# Patient Record
Sex: FEMALE | URBAN - METROPOLITAN AREA
[De-identification: names, ages, dates, MRNs, and addresses within clinical notes are randomized per-mention and may not be internally consistent; named-entity substitution may affect disease eponyms.]

---

## 2021-01-01 ENCOUNTER — INPATIENT (INPATIENT)
Facility: HOSPITAL | Age: 56
LOS: 0 days | End: 2021-03-29
Attending: INTERNAL MEDICINE | Admitting: INTERNAL MEDICINE
Payer: SUBSIDIZED

## 2021-01-01 VITALS — DIASTOLIC BLOOD PRESSURE: 143 MMHG | SYSTOLIC BLOOD PRESSURE: 259 MMHG

## 2021-01-01 DIAGNOSIS — I61.8 OTHER NONTRAUMATIC INTRACEREBRAL HEMORRHAGE: ICD-10-CM

## 2021-01-01 DIAGNOSIS — Z51.5 ENCOUNTER FOR PALLIATIVE CARE: ICD-10-CM

## 2021-01-01 DIAGNOSIS — I61.9 NONTRAUMATIC INTRACEREBRAL HEMORRHAGE, UNSPECIFIED: ICD-10-CM

## 2021-01-01 DIAGNOSIS — J96.01 ACUTE RESPIRATORY FAILURE WITH HYPOXIA: ICD-10-CM

## 2021-01-01 LAB
ALBUMIN SERPL ELPH-MCNC: 3.7 G/DL — SIGNIFICANT CHANGE UP (ref 3.3–5)
ALP SERPL-CCNC: 112 U/L — SIGNIFICANT CHANGE UP (ref 40–120)
ALT FLD-CCNC: 21 U/L — SIGNIFICANT CHANGE UP (ref 4–33)
ANION GAP SERPL CALC-SCNC: 11 MMOL/L — SIGNIFICANT CHANGE UP (ref 7–14)
ANION GAP SERPL CALC-SCNC: 13 MMOL/L — SIGNIFICANT CHANGE UP (ref 7–14)
AST SERPL-CCNC: 15 U/L — SIGNIFICANT CHANGE UP (ref 4–32)
BILIRUB SERPL-MCNC: 0.9 MG/DL — SIGNIFICANT CHANGE UP (ref 0.2–1.2)
BLD GP AB SCN SERPL QL: NEGATIVE — SIGNIFICANT CHANGE UP
BLOOD GAS ARTERIAL COMPREHENSIVE RESULT: SIGNIFICANT CHANGE UP
BUN SERPL-MCNC: 14 MG/DL — SIGNIFICANT CHANGE UP (ref 7–23)
BUN SERPL-MCNC: 20 MG/DL — SIGNIFICANT CHANGE UP (ref 7–23)
CALCIUM SERPL-MCNC: 8.6 MG/DL — SIGNIFICANT CHANGE UP (ref 8.4–10.5)
CALCIUM SERPL-MCNC: 9.2 MG/DL — SIGNIFICANT CHANGE UP (ref 8.4–10.5)
CHLORIDE SERPL-SCNC: 105 MMOL/L — SIGNIFICANT CHANGE UP (ref 98–107)
CHLORIDE SERPL-SCNC: 112 MMOL/L — HIGH (ref 98–107)
CO2 SERPL-SCNC: 22 MMOL/L — SIGNIFICANT CHANGE UP (ref 22–31)
CO2 SERPL-SCNC: 24 MMOL/L — SIGNIFICANT CHANGE UP (ref 22–31)
CREAT SERPL-MCNC: 0.81 MG/DL — SIGNIFICANT CHANGE UP (ref 0.5–1.3)
CREAT SERPL-MCNC: 1.22 MG/DL — SIGNIFICANT CHANGE UP (ref 0.5–1.3)
GLUCOSE BLDC GLUCOMTR-MCNC: 115 MG/DL — HIGH (ref 70–99)
GLUCOSE BLDC GLUCOMTR-MCNC: 150 MG/DL — HIGH (ref 70–99)
GLUCOSE SERPL-MCNC: 121 MG/DL — HIGH (ref 70–99)
GLUCOSE SERPL-MCNC: 122 MG/DL — HIGH (ref 70–99)
HCT VFR BLD CALC: 39.2 % — SIGNIFICANT CHANGE UP (ref 34.5–45)
HCT VFR BLD CALC: 39.7 % — SIGNIFICANT CHANGE UP (ref 34.5–45)
HGB BLD-MCNC: 12.7 G/DL — SIGNIFICANT CHANGE UP (ref 11.5–15.5)
HGB BLD-MCNC: 13.3 G/DL — SIGNIFICANT CHANGE UP (ref 11.5–15.5)
MAGNESIUM SERPL-MCNC: 1.8 MG/DL — SIGNIFICANT CHANGE UP (ref 1.6–2.6)
MAGNESIUM SERPL-MCNC: 2 MG/DL — SIGNIFICANT CHANGE UP (ref 1.6–2.6)
MCHC RBC-ENTMCNC: 28.3 PG — SIGNIFICANT CHANGE UP (ref 27–34)
MCHC RBC-ENTMCNC: 28.9 PG — SIGNIFICANT CHANGE UP (ref 27–34)
MCHC RBC-ENTMCNC: 32.4 GM/DL — SIGNIFICANT CHANGE UP (ref 32–36)
MCHC RBC-ENTMCNC: 33.5 GM/DL — SIGNIFICANT CHANGE UP (ref 32–36)
MCV RBC AUTO: 86.3 FL — SIGNIFICANT CHANGE UP (ref 80–100)
MCV RBC AUTO: 87.3 FL — SIGNIFICANT CHANGE UP (ref 80–100)
NRBC # BLD: 0 /100 WBCS — SIGNIFICANT CHANGE UP
NRBC # BLD: 0 /100 WBCS — SIGNIFICANT CHANGE UP
NRBC # FLD: 0 K/UL — SIGNIFICANT CHANGE UP
NRBC # FLD: 0.02 K/UL — HIGH
PHOSPHATE SERPL-MCNC: 2.3 MG/DL — LOW (ref 2.5–4.5)
PHOSPHATE SERPL-MCNC: 3.6 MG/DL — SIGNIFICANT CHANGE UP (ref 2.5–4.5)
PLATELET # BLD AUTO: 324 K/UL — SIGNIFICANT CHANGE UP (ref 150–400)
PLATELET # BLD AUTO: 440 K/UL — HIGH (ref 150–400)
POTASSIUM SERPL-MCNC: 3.1 MMOL/L — LOW (ref 3.5–5.3)
POTASSIUM SERPL-MCNC: 3.2 MMOL/L — LOW (ref 3.5–5.3)
POTASSIUM SERPL-SCNC: 3.1 MMOL/L — LOW (ref 3.5–5.3)
POTASSIUM SERPL-SCNC: 3.2 MMOL/L — LOW (ref 3.5–5.3)
PROT SERPL-MCNC: 5.9 G/DL — LOW (ref 6–8.3)
RBC # BLD: 4.49 M/UL — SIGNIFICANT CHANGE UP (ref 3.8–5.2)
RBC # BLD: 4.6 M/UL — SIGNIFICANT CHANGE UP (ref 3.8–5.2)
RBC # FLD: 12.9 % — SIGNIFICANT CHANGE UP (ref 10.3–14.5)
RBC # FLD: 13.4 % — SIGNIFICANT CHANGE UP (ref 10.3–14.5)
RH IG SCN BLD-IMP: POSITIVE — SIGNIFICANT CHANGE UP
SODIUM SERPL-SCNC: 140 MMOL/L — SIGNIFICANT CHANGE UP (ref 135–145)
SODIUM SERPL-SCNC: 147 MMOL/L — HIGH (ref 135–145)
WBC # BLD: 18.57 K/UL — HIGH (ref 3.8–10.5)
WBC # BLD: 8.14 K/UL — SIGNIFICANT CHANGE UP (ref 3.8–10.5)
WBC # FLD AUTO: 18.57 K/UL — HIGH (ref 3.8–10.5)
WBC # FLD AUTO: 8.14 K/UL — SIGNIFICANT CHANGE UP (ref 3.8–10.5)

## 2021-01-01 PROCEDURE — 70496 CT ANGIOGRAPHY HEAD: CPT | Mod: 26

## 2021-01-01 PROCEDURE — 71045 X-RAY EXAM CHEST 1 VIEW: CPT | Mod: 26,76

## 2021-01-01 PROCEDURE — 36556 INSERT NON-TUNNEL CV CATH: CPT

## 2021-01-01 PROCEDURE — 99291 CRITICAL CARE FIRST HOUR: CPT | Mod: 25

## 2021-01-01 PROCEDURE — 31500 INSERT EMERGENCY AIRWAY: CPT

## 2021-01-01 PROCEDURE — 36620 INSERTION CATHETER ARTERY: CPT | Mod: GC

## 2021-01-01 PROCEDURE — 99222 1ST HOSP IP/OBS MODERATE 55: CPT | Mod: GC

## 2021-01-01 PROCEDURE — 70498 CT ANGIOGRAPHY NECK: CPT | Mod: 26

## 2021-01-01 PROCEDURE — 70450 CT HEAD/BRAIN W/O DYE: CPT | Mod: 26,59

## 2021-01-01 RX ORDER — CHLORHEXIDINE GLUCONATE 213 G/1000ML
15 SOLUTION TOPICAL EVERY 12 HOURS
Refills: 0 | Status: DISCONTINUED | OUTPATIENT
Start: 2021-01-01 | End: 2021-03-30

## 2021-01-01 RX ORDER — NOREPINEPHRINE BITARTRATE/D5W 8 MG/250ML
0.05 PLASTIC BAG, INJECTION (ML) INTRAVENOUS
Qty: 8 | Refills: 0 | Status: DISCONTINUED | OUTPATIENT
Start: 2021-01-01 | End: 2021-03-30

## 2021-01-01 RX ORDER — POTASSIUM CHLORIDE 20 MEQ
40 PACKET (EA) ORAL ONCE
Refills: 0 | Status: DISCONTINUED | OUTPATIENT
Start: 2021-01-01 | End: 2021-01-01

## 2021-01-01 RX ORDER — NICARDIPINE HYDROCHLORIDE 30 MG/1
5 CAPSULE, EXTENDED RELEASE ORAL
Qty: 40 | Refills: 0 | Status: DISCONTINUED | OUTPATIENT
Start: 2021-01-01 | End: 2021-03-30

## 2021-01-01 RX ORDER — POTASSIUM CHLORIDE 20 MEQ
40 PACKET (EA) ORAL ONCE
Refills: 0 | Status: COMPLETED | OUTPATIENT
Start: 2021-01-01 | End: 2021-01-01

## 2021-01-01 RX ORDER — SODIUM CHLORIDE 9 MG/ML
10 INJECTION INTRAMUSCULAR; INTRAVENOUS; SUBCUTANEOUS
Refills: 0 | Status: DISCONTINUED | OUTPATIENT
Start: 2021-01-01 | End: 2021-01-01

## 2021-01-01 RX ORDER — PROPOFOL 10 MG/ML
100 INJECTION, EMULSION INTRAVENOUS ONCE
Refills: 0 | Status: COMPLETED | OUTPATIENT
Start: 2021-01-01 | End: 2021-01-01

## 2021-01-01 RX ORDER — NICARDIPINE HYDROCHLORIDE 30 MG/1
5 CAPSULE, EXTENDED RELEASE ORAL
Qty: 40 | Refills: 0 | Status: DISCONTINUED | OUTPATIENT
Start: 2021-01-01 | End: 2021-01-01

## 2021-01-01 RX ORDER — MIDAZOLAM HYDROCHLORIDE 1 MG/ML
4 INJECTION, SOLUTION INTRAMUSCULAR; INTRAVENOUS ONCE
Refills: 0 | Status: DISCONTINUED | OUTPATIENT
Start: 2021-01-01 | End: 2021-01-01

## 2021-01-01 RX ORDER — SODIUM CHLORIDE 9 MG/ML
1000 INJECTION, SOLUTION INTRAVENOUS
Refills: 0 | Status: DISCONTINUED | OUTPATIENT
Start: 2021-01-01 | End: 2021-03-30

## 2021-01-01 RX ORDER — FENTANYL CITRATE 50 UG/ML
0.5 INJECTION INTRAVENOUS
Qty: 2500 | Refills: 0 | Status: DISCONTINUED | OUTPATIENT
Start: 2021-01-01 | End: 2021-01-01

## 2021-01-01 RX ORDER — LABETALOL HCL 100 MG
5 TABLET ORAL ONCE
Refills: 0 | Status: COMPLETED | OUTPATIENT
Start: 2021-01-01 | End: 2021-01-01

## 2021-01-01 RX ORDER — MIDAZOLAM HYDROCHLORIDE 1 MG/ML
0.02 INJECTION, SOLUTION INTRAMUSCULAR; INTRAVENOUS
Qty: 100 | Refills: 0 | Status: DISCONTINUED | OUTPATIENT
Start: 2021-01-01 | End: 2021-01-01

## 2021-01-01 RX ORDER — CHLORHEXIDINE GLUCONATE 213 G/1000ML
1 SOLUTION TOPICAL
Refills: 0 | Status: DISCONTINUED | OUTPATIENT
Start: 2021-01-01 | End: 2021-01-01

## 2021-01-01 RX ORDER — POTASSIUM PHOSPHATE, MONOBASIC POTASSIUM PHOSPHATE, DIBASIC 236; 224 MG/ML; MG/ML
15 INJECTION, SOLUTION INTRAVENOUS ONCE
Refills: 0 | Status: COMPLETED | OUTPATIENT
Start: 2021-01-01 | End: 2021-01-01

## 2021-01-01 RX ORDER — ETOMIDATE 2 MG/ML
20 INJECTION INTRAVENOUS ONCE
Refills: 0 | Status: COMPLETED | OUTPATIENT
Start: 2021-01-01 | End: 2021-01-01

## 2021-01-01 RX ADMIN — Medication 40 MILLIEQUIVALENT(S): at 16:43

## 2021-01-01 RX ADMIN — CHLORHEXIDINE GLUCONATE 1 APPLICATION(S): 213 SOLUTION TOPICAL at 06:17

## 2021-01-01 RX ADMIN — ETOMIDATE 20 MILLIGRAM(S): 2 INJECTION INTRAVENOUS at 08:02

## 2021-01-01 RX ADMIN — Medication 6.56 MICROGRAM(S)/KG/MIN: at 10:00

## 2021-01-01 RX ADMIN — NICARDIPINE HYDROCHLORIDE 25 MG/HR: 30 CAPSULE, EXTENDED RELEASE ORAL at 17:13

## 2021-01-01 RX ADMIN — MIDAZOLAM HYDROCHLORIDE 4 MILLIGRAM(S): 1 INJECTION, SOLUTION INTRAMUSCULAR; INTRAVENOUS at 08:02

## 2021-01-01 RX ADMIN — Medication 6.56 MICROGRAM(S)/KG/MIN: at 11:25

## 2021-01-01 RX ADMIN — Medication 40 MILLIEQUIVALENT(S): at 11:28

## 2021-01-01 RX ADMIN — POTASSIUM PHOSPHATE, MONOBASIC POTASSIUM PHOSPHATE, DIBASIC 62.5 MILLIMOLE(S): 236; 224 INJECTION, SOLUTION INTRAVENOUS at 16:36

## 2021-01-01 RX ADMIN — Medication 6.56 MICROGRAM(S)/KG/MIN: at 08:45

## 2021-01-01 RX ADMIN — SODIUM CHLORIDE 75 MILLILITER(S): 9 INJECTION, SOLUTION INTRAVENOUS at 11:25

## 2021-01-01 RX ADMIN — Medication 6.56 MICROGRAM(S)/KG/MIN: at 07:51

## 2021-01-01 RX ADMIN — Medication 5 MILLIGRAM(S): at 15:52

## 2021-01-01 RX ADMIN — CHLORHEXIDINE GLUCONATE 15 MILLILITER(S): 213 SOLUTION TOPICAL at 05:14

## 2021-01-01 RX ADMIN — CHLORHEXIDINE GLUCONATE 15 MILLILITER(S): 213 SOLUTION TOPICAL at 17:55

## 2021-01-01 RX ADMIN — CHLORHEXIDINE GLUCONATE 15 MILLILITER(S): 213 SOLUTION TOPICAL at 18:05

## 2021-01-01 RX ADMIN — PROPOFOL 100 MILLIGRAM(S): 10 INJECTION, EMULSION INTRAVENOUS at 08:03

## 2021-03-28 NOTE — CONSULT NOTE ADULT - ASSESSMENT
56F w/ PMH of HTN presents w/ AMS and reported right hemiparesis in setting of drug use (cocaine) found to have large IPH w/ IVH extension and hydrocephalus.     Code stroke called  NIHSS   LKN 0500 3/28/21  No tpa given ICH   CTH w/ large L. IPH w/ IVH extension and hydrocephalus   CTA h/n pending     Impression: IPH w/ IVH extension and hydrocephalus secondary to HTN in setting of cocaine use.     Plan:   [] NSG consult STAT  [] CTH 4h; CTH 24H (unless MR brain done instead)   [] BP Goal: <160/90; Will likely need cardene drip   [] Mechanical DVT ppx for now   [] Hold all antiplatelets  [] Avoid hypernatremia; No need for hypertonic saline or osmotic agents at this time   [] PT/OT 56F w/ PMH of HTN presents w/ AMS and reported right hemiparesis in setting of drug use (cocaine) found to have large IPH w/ IVH extension and hydrocephalus.     Code stroke called  NIHSS   LKN 0500 3/28/21  No tpa given ICH   CTH w/ large L. IPH w/ IVH extension and hydrocephalus   CTA h/n pending     Impression: IPH w/ IVH extension and hydrocephalus secondary to HTN in setting of cocaine use. ICH score 4 (97% mortality)     Plan:   [] NSG consult STAT  [] CTH 4h; CTH 24H (unless MR brain done instead)   [] BP Goal: <160/90; Will likely need cardene drip   [] Mechanical DVT ppx for now   [] Hold all antiplatelets  [] Avoid hypernatremia; No need for hypertonic saline or osmotic agents at this time   [] PT/OT

## 2021-03-28 NOTE — H&P ADULT - HISTORY OF PRESENT ILLNESS
56F BIBEMS after being found unresponsive at home by boyfriend. Of note had been on cocaine and shared a bottle of rum with boyfriend in the day prior.     In ED minimally responsive to noxious stimuli, unable to protect airway so intubated. Stroke code called, CTH showing large L-sided IPH in left basal ganglia with left to right subfalcine herniation, left uncal herniation, and downward herniation, with intraventricular extension of hemorrhage. NSGY called, recommended no interventions. Advised performing brain death protocol. Initially hypertensive to 200s sys, started on cardene gtt. Subsequently became hypotensive, cardene gtt dc'd and started levo.    56F BIBEMS for altered mental status. Was doing cocaine and drinking alcohol with her boyfriend. Patient was noted to be normal at 0500 3/28/21 by boyfriend. At 0530, she reportedly developed right sided numbness and facial droop. She became unresponsive shortly therafter. EMS was called and brought to [facility] ED. Per boyfriend, she was using cocaine heavily that night.     In ED minimally responsive to noxious stimuli, unable to protect airway so intubated. Stroke code called, CTH showing large L-sided IPH in left basal ganglia with left to right subfalcine herniation, left uncal herniation, and downward herniation, with intraventricular extension of hemorrhage. NSGY called, recommended no interventions. Advised performing brain death protocol. Initially hypertensive to 200s sys, started on cardene gtt. Subsequently became hypotensive, cardene gtt dc'd and started levo.

## 2021-03-28 NOTE — ED ADULT NURSE NOTE - OBJECTIVE STATEMENT
Patient is a 56y female, brought directly to trauma C. Patient was brought in by ambulance. As per EMS, patients  said they were "doing cocaine all night." Patient unresponsive. On nonrebreather mask oxygen at 10L. IV initiated 20 gauge right hand and 18 gauge left hand. Blood glucose fingerstick completed at 0713 with result of 218. Patient on cardiac monitor. Hypertensive. Breathing labored and noisy. ED tech, RNs and MDs at bedside. Patient is a 56y female, brought directly to trauma C. Patient was brought in by ambulance. As per EMS, patients  said they were "doing cocaine all night." Patient unresponsive. On nonrebreather mask oxygen at 10L. IV initiated 20 gauge right hand and 18 gauge left hand. Blood glucose fingerstick completed at 0713 with result of 218. Patient on cardiac monitor. Hypertensive. Breathing labored and noisy. ED tech, RNs and MDs at bedside. Patient skin on left upper extremity is red in color. Patient is a 56y female, brought directly to trauma C. Patient was brought in by ambulance. As per EMS, patients  said they were "doing cocaine all night." Patient unresponsive. On nonrebreather mask oxygen at 10L. IV initiated 20 gauge right hand and 18 gauge left hand. Blood glucose fingerstick completed at 0713 with result of 218. Patient on cardiac monitor. Hypertensive. Breathing labored and noisy. ED tech, RNs and MDs at bedside. Patient skin on left upper extremity is red in color.   Noel urinary Catheter inserted by facilitator RN, 14 Singaporean. Patient is a 56y female, brought directly to trauma C. Patient was brought in by ambulance. As per EMS, patients  said they were "doing cocaine all night." Patient unresponsive. On nonrebreather mask oxygen at 10L. IV initiated 20 gauge right hand and 18 gauge left hand. Blood glucose fingerstick completed at 0713 with result of 218. Patient on cardiac monitor. Hypertensive. Breathing labored and noisy. ED tech, RNs and MDs at bedside. Patient skin on left upper extremity is red in color. Patient intubated, 23 at lip, size 7.5  Noel urinary Catheter inserted by facilitator RN, 14 Austrian.

## 2021-03-28 NOTE — ED PROVIDER NOTE - PROGRESS NOTE DETAILS
Iain Jason MD: 7:40am: called boyfriend  at [phone]: reports that the pt went to lie down at 5am, pt reported R arm numbness and tingling and then tried to get comfortable - pt then started getting unable to stand, slurring speech, boyfriend unable to understand speech, called EMS, said she had difficulty breathing at that time too but attributed to the cocaine. boyfriend reports they had cocaine/etoh intake over the past 2 days, shared 1 bottle rum over 2 days, the pt ingested 2g cocaine. code stroke called Large head bleed on CT scan. Nicardipine gtt ordered, RN aware, will start when back from CT. NSGY paged.  Keyshawn Cobb M.D. PGY-3 Pt has 2 children ages 21, 16. Boyfriend unaware of their numbers. Only family contact number provided is uncle Yobany [phone]. Will involve APPLE Cobb M.D. PGY-3 Pt returned from CT first BP significantly lower. Cardene gtt not yet started, monitor BP may need levo. NSGY at bedside

## 2021-03-28 NOTE — CONSULT NOTE ADULT - SUBJECTIVE AND OBJECTIVE BOX
ADONAY GUEVARA 56y ,Female  HPI:   per patients boyfriend,  pt went to lie down at 5am, she reported R arm numbness and tingling and then tried to get comfortable - pt then started getting unable to stand, slurring speech, boyfriend unable to understand speech, called EMS, said she had difficulty breathing at that time too but attributed to the cocaine. boyfriend reports they had cocaine/etoh intake over the past 2 days, shared 1 bottle rum over 2 days, the pt ingested 2g cocaine. patient became unresponsive, as per ER resident, patient was fixed and dilated on arrival with no spontaneous movements, code stroke called.  CTH shows a large left parietal ICH with intraventricular extension w/ complete effacement of the cisterns and 4th ventricle and uncal herniation, will f/u with official read from neuroradiology.    PAST MEDICAL & SURGICAL HISTORY:  unknown    Allergies  No Known Allergies    MEDICATIONS  (STANDING):  niCARdipine Infusion 5 mG/Hr (25 mL/Hr) IV Continuous <Continuous>    MEDICATIONS  (PRN):    Vital Signs Last 24 Hrs  T(C): --  T(F): --  HR: 120 (28 Mar 2021 07:44) (76 - 120)  BP: 245/185 (28 Mar 2021 07:52) (219/134 - 259/143)  BP(mean): --  RR: 21 (28 Mar 2021 07:44) (21 - 21)  SpO2: 100% (28 Mar 2021 07:44) (100% - 100%)    PE: intubated, off sedation, no paralytics given for intubation  pupils fixed and dilated, no corneal reflex, no gag, negative occulocephalic's  no movement to noxious stimuli    LABS:                        15.4   13.93 )-----------( 508      ( 28 Mar 2021 07:33 )             47.0     03-28    134<L>  |  97<L>  |  14  ----------------------------<  246<H>  5.1   |  23  |  0.65    Ca    10.1      28 Mar 2021 07:33    TPro  8.4<H>  /  Alb  4.5  /  TBili  0.7  /  DBili  x   /  AST  x   /  ALT  33  /  AlkPhos  135<H>  03-28    PT/INR - ( 28 Mar 2021 07:33 )   PT: 11.3 sec;   INR: 0.99 ratio         PTT - ( 28 Mar 2021 07:33 )  PTT:29.4 sec

## 2021-03-28 NOTE — H&P ADULT - ASSESSMENT
56F presents unresponsive and found to have large intraparenchymal hemorrhage, with midline shift and herniation.     #Neuro    #CV    #Resp    #Renal    #GI    #Heme     #ID    #Endo  56F presents unresponsive and found to have large intraparenchymal hemorrhage, with midline shift and herniation.     #Neuro  -presents with unresponsiveness after reporting left sided numbness, in setting of recent cocaine use. CTH showing large L-sided IPH in left basal ganglia with left to right subfalcine herniation, left uncal herniation, and downward herniation, with intraventricular extension of hemorrhage.   -pupils fixed and dilated. NSGY called, recommended no interventions. Advised performing brain death protocol.      #CV  -s/p cardene gtt now shock state on levo  -recent cocaine use, avoid beta blockade     #Resp  -intubated for airway protection in ED  -not triggering breaths    -on 16/400/5/40    #Renal  -renal function intact   -monitor BMP, strict I/Os    #GI  -NPO for now     #Heme   -no active issues   -no AC given brain bleed     #ID  -leukocytosis, afebrile. monitor for signs of infection     #Endo   -FSG Q6 while NPO

## 2021-03-28 NOTE — ED PROVIDER NOTE - CLINICAL SUMMARY MEDICAL DECISION MAKING FREE TEXT BOX
56F presents with AMS: cocaine toxicity vs likely CVA event. Intubated for airway protection. CT head shows large ICH. NSGY following, will admit to MICU.

## 2021-03-28 NOTE — PROCEDURE NOTE - NSPROCDETAILS_GEN_ALL_CORE
location identified, draped/prepped, sterile technique used, needle inserted/introduced/positive blood return obtained via catheter/connected to a pressurized flush line/sutured in place identified with ultrasound/location identified, draped/prepped, sterile technique used, needle inserted/introduced/positive blood return obtained via catheter/connected to a pressurized flush line/sutured in place

## 2021-03-28 NOTE — H&P ADULT - ATTENDING COMMENTS
56 F with unknown pmh here after doing cocaine the last few days and now with right sided numbness, acute hypoxemic respiratory failure requiring intubation due to large left-sided intraparenchymal hemorrhage and left uncal herniation, likely due to cocaine use.    Will keep SBP goal <140 given bleed.  Will check labs, assess for brain death  may need abx for possible aspiration event  f/u COVID  very poor prognosis, family aware

## 2021-03-28 NOTE — ED PROVIDER NOTE - OBJECTIVE STATEMENT
56F presents with AMS. EMS reports pt was doing cocaine and drinking alcohol with her boyfriend when she eventually became altered and 911 was called.  Unknown PMH/PSH/allergies

## 2021-03-28 NOTE — CONSULT NOTE ADULT - ATTENDING COMMENTS
Reviewed clinical history exam findings and neuroimaging with resident, very poor prognosis upon review of chart and brain imaging..  Unable to examine the patient for brain death protocol since she is hypothermic. Re-exame when able.

## 2021-03-28 NOTE — ED PROVIDER NOTE - ATTENDING CONTRIBUTION TO CARE
Eren WELLS: I agree with the above provided history and exam and addend/modify it as follows.    55F w/ pmh HTN (not on any meds) - bib EMS for drug OD - only responsive painful stimuli. pt not spontaneously opening eyes, w/ stridulous coarse noisy breathing, increased work of breathing.     *GEN:   unconscious, unresponsive  *EYES:   pupils dilated bilaterally unresponsive to light  *HEENT:   airway patent  *CV:   regular rate and rhythm  *RESP:   coarse loud breath sounds, agonal breathing with increased work   *ABD:   soft, non-tender  *:   no cva/flank tenderness  *EXTREM:   no MSK tenderness, full ROM throughout, no leg swelling  *SKIN:   dry, intact  *NEURO:   unconscious, unresponsive    concern for substance overdose - patient intubated for airway protection. Per discussion with pt's boyfriend concern for acute stroke, code stroke ordered. Lower concern for dissection given no report of chest pain by boyfriend, equal pulses / perfusion bilat upper and lower extremities.    I Iain Jason MD performed a history and physical exam of the patient and discussed their management with the resident and /or advanced care provider. I reviewed the resident and /or ACP's note and agree with the documented findings and plan of care. My medical decision making and observations are found above.

## 2021-03-28 NOTE — CONSULT NOTE ADULT - SUBJECTIVE AND OBJECTIVE BOX
ADONAY GUEVRAA  Female  MRN-[id number]    HPI:    56F w/ PMH of HTN presents w/ AMS and reported right hemiparesis in setting of drug use (cocaine)     Patient was noted to be normal at 0500 3/28/21 by boyfriend. At 0530, she reportedly developed right sided numbness and facial droop. She became unresponsive shortly therafter. EMS was called and brought to [facility] ED.     In ED, she was reportedly "responding to noxious stimuli on arrival". Pupils were reportedly fixed. She was intubated for airway protection. Code stroke called given the focal findings preceding the AMS.     Code stroke called  NIHSS   LKN 0500 3/28/21  No tpa given ***  CTH  CTA h/n     NIHSS:  MRS: 0    ROS: All negative except as mentioned in HPI    PAST MEDICAL & SURGICAL HISTORY:    MEDICATIONS  (STANDING):  etomidate Injectable 20 milliGRAM(s) IV Push Once  fentaNYL   Infusion. 0.5 MICROgram(s)/kG/Hr (3.5 mL/Hr) IV Continuous <Continuous>  midazolam Infusion 0.02 mG/kG/Hr (1.4 mL/Hr) IV Continuous <Continuous>  midazolam Injectable 4 milliGRAM(s) IV Push Once  propofol Injectable 100 milliGRAM(s) IV Push once    MEDICATIONS  (PRN):    Allergies    No Known Allergies    Intolerances        VITAL SIGNS:  T(F): --  HR: 120  BP: 245/185  RR: 21  SpO2: 100%    Exam:   Gen: Intubated  MS: Eyes closed. Does not open to verbal or noxious stimuli. Does not follow any commands.   CN: Pupils 5mm and fixed; No OCR. Face symmetric though difficult to ascertain given intubation.   Motor: No movement in the UEs. LE triple flexion.     LABS:                          15.4   13.93 )-----------( 508      ( 28 Mar 2021 07:33 )             47.0           PT/INR - ( 28 Mar 2021 07:33 )   PT: 11.3 sec;   INR: 0.99 ratio         PTT - ( 28 Mar 2021 07:33 )  PTT:29.4 sec    RADIOLOGY & ADDITIONAL STUDIES:             ADONAY GUEVARA  Female  MRN-[id number]    HPI:    56F w/ PMH of HTN presents w/ AMS and reported right hemiparesis in setting of drug use (cocaine)     Patient was noted to be normal at 0500 3/28/21 by boyfriend. At 0530, she reportedly developed right sided numbness and facial droop. She became unresponsive shortly therafter. EMS was called and brought to [facility] ED. Per boyfriend, she was using cocaine heavily that night.     In ED, she was reportedly "responding to noxious stimuli on arrival". Pupils were reportedly fixed. She was intubated for airway protection. Code stroke called given the focal findings preceding the AMS.     Code stroke called  NIHSS   LKN 0500 3/28/21  No tpa given ***  CTH  CTA h/n     NIHSS:  MRS: 0    ROS: All negative except as mentioned in HPI    PAST MEDICAL & SURGICAL HISTORY:    MEDICATIONS  (STANDING):  etomidate Injectable 20 milliGRAM(s) IV Push Once  fentaNYL   Infusion. 0.5 MICROgram(s)/kG/Hr (3.5 mL/Hr) IV Continuous <Continuous>  midazolam Infusion 0.02 mG/kG/Hr (1.4 mL/Hr) IV Continuous <Continuous>  midazolam Injectable 4 milliGRAM(s) IV Push Once  propofol Injectable 100 milliGRAM(s) IV Push once    MEDICATIONS  (PRN):    Allergies    No Known Allergies    Intolerances        VITAL SIGNS:  T(F): --  HR: 120  BP: 245/185  RR: 21  SpO2: 100%    Exam:   Gen: Intubated  MS: Eyes closed. Does not open to verbal or noxious stimuli. Does not follow any commands.   CN: Pupils 5mm and fixed; No OCR. Face symmetric though difficult to ascertain given intubation.   Motor: No movement in the UEs. LE triple flexion.     LABS:                          15.4   13.93 )-----------( 508      ( 28 Mar 2021 07:33 )             47.0           PT/INR - ( 28 Mar 2021 07:33 )   PT: 11.3 sec;   INR: 0.99 ratio         PTT - ( 28 Mar 2021 07:33 )  PTT:29.4 sec    RADIOLOGY & ADDITIONAL STUDIES:             ADONAY GUEVARA  Female  MRN-[id number]    HPI:    56F w/ PMH of HTN presents w/ AMS and reported right hemiparesis in setting of drug use (cocaine)     Patient was noted to be normal at 0500 3/28/21 by boyfriend. At 0530, she reportedly developed right sided numbness and facial droop. She became unresponsive shortly therafter. EMS was called and brought to [facility] ED. Per boyfriend, she was using cocaine heavily that night.     In ED, she was reportedly "responding to noxious stimuli on arrival". Pupils were reportedly fixed. She was intubated for airway protection. Code stroke called given the focal findings preceding the AMS.     Code stroke called  NIHSS   LKN 0500 3/28/21  No tpa given ICH   CTH w/ large L. IPH w/ IVH extension and hydrocephalus   CTA h/n pending     NIHSS:  MRS: 0    ROS: All negative except as mentioned in HPI    PAST MEDICAL & SURGICAL HISTORY:    MEDICATIONS  (STANDING):  etomidate Injectable 20 milliGRAM(s) IV Push Once  fentaNYL   Infusion. 0.5 MICROgram(s)/kG/Hr (3.5 mL/Hr) IV Continuous <Continuous>  midazolam Infusion 0.02 mG/kG/Hr (1.4 mL/Hr) IV Continuous <Continuous>  midazolam Injectable 4 milliGRAM(s) IV Push Once  propofol Injectable 100 milliGRAM(s) IV Push once    MEDICATIONS  (PRN):    Allergies    No Known Allergies    Intolerances        VITAL SIGNS:  T(F): --  HR: 120  BP: 245/185  RR: 21  SpO2: 100%    Exam:   Gen: Intubated  MS: Eyes closed. Does not open to verbal or noxious stimuli. Does not follow any commands.   CN: Pupils 5mm and fixed; No OCR. Face symmetric though difficult to ascertain given intubation.   Motor: No movement in the UEs. LE triple flexion.     LABS:                          15.4   13.93 )-----------( 508      ( 28 Mar 2021 07:33 )             47.0           PT/INR - ( 28 Mar 2021 07:33 )   PT: 11.3 sec;   INR: 0.99 ratio         PTT - ( 28 Mar 2021 07:33 )  PTT:29.4 sec    RADIOLOGY & ADDITIONAL STUDIES:             ADONAY GUEVARA  Female  MRN-[id number]    HPI:    56F w/ PMH of HTN presents w/ AMS and reported right hemiparesis in setting of drug use (cocaine)     Patient was noted to be normal at 0500 3/28/21 by boyfriend. At 0530, she reportedly developed right sided numbness and facial droop. She became unresponsive shortly therafter. EMS was called and brought to [facility] ED. Per boyfriend, she was using cocaine heavily that night.     In ED, she was reportedly "responding to noxious stimuli on arrival". Pupils were reportedly fixed. She was intubated for airway protection. Code stroke called given the focal findings preceding the AMS.     Code stroke called  NIHSS   LKN 0500 3/28/21  No tpa given ICH   CTH w/ large L. IPH w/ IVH extension and hydrocephalus   CTA h/n pending     NIHSS:  MRS: 0  ICH score 4    ROS: All negative except as mentioned in HPI    PAST MEDICAL & SURGICAL HISTORY:    MEDICATIONS  (STANDING):  etomidate Injectable 20 milliGRAM(s) IV Push Once  fentaNYL   Infusion. 0.5 MICROgram(s)/kG/Hr (3.5 mL/Hr) IV Continuous <Continuous>  midazolam Infusion 0.02 mG/kG/Hr (1.4 mL/Hr) IV Continuous <Continuous>  midazolam Injectable 4 milliGRAM(s) IV Push Once  propofol Injectable 100 milliGRAM(s) IV Push once    MEDICATIONS  (PRN):    Allergies    No Known Allergies    Intolerances        VITAL SIGNS:  T(F): --  HR: 120  BP: 245/185  RR: 21  SpO2: 100%    Exam:   Gen: Intubated  MS: Eyes closed. Does not open to verbal or noxious stimuli. Does not follow any commands.   CN: Pupils 5mm and fixed; No OCR. Face symmetric though difficult to ascertain given intubation.   Motor: No movement in the UEs. LE triple flexion.     LABS:                          15.4   13.93 )-----------( 508      ( 28 Mar 2021 07:33 )             47.0           PT/INR - ( 28 Mar 2021 07:33 )   PT: 11.3 sec;   INR: 0.99 ratio         PTT - ( 28 Mar 2021 07:33 )  PTT:29.4 sec    RADIOLOGY & ADDITIONAL STUDIES:

## 2021-03-28 NOTE — ED ADULT NURSE REASSESSMENT NOTE - NS ED NURSE REASSESS COMMENT FT1
Received report from SHARIF Shore. Pt was in CT scan now returns to [facility]. Pt intubated, no sedation at this time. Pt unrepsonsive to painful stimuli. Pt was previously hypertensive, now hypotensive on arrival back to [facility]. Neurosurg at bedside, will continue to monitor.

## 2021-03-28 NOTE — ED PROCEDURE NOTE - CPROC ED TIME OUT STATEMENT1
“Patient's name, , procedure and correct site were confirmed during the [place] Timeout.”
“Patient's name, , procedure and correct site were confirmed during the [place] Timeout.”

## 2021-03-28 NOTE — PROCEDURE NOTE - NSINDICATIONS_GEN_A_CORE
arterial puncture to obtain ABG's/blood sampling arterial puncture to obtain ABG's/blood sampling/critical patient

## 2021-03-28 NOTE — H&P ADULT - NSHPLABSRESULTS_GEN_ALL_CORE
LABS:                        15.4   13.93 )-----------( 508      ( 28 Mar 2021 07:33 )             47.0         134<L>  |  97<L>  |  14  ----------------------------<  246<H>  5.1   |  23  |  0.65    Ca    10.1      28 Mar 2021 07:33    TPro  8.4<H>  /  Alb  4.5  /  TBili  0.7  /  DBili  x   /  AST  51<H>  /  ALT  33  /  AlkPhos  135<H>      PT/INR - ( 28 Mar 2021 07:33 )   PT: 11.3 sec;   INR: 0.99 ratio         PTT - ( 28 Mar 2021 07:33 )  PTT:29.4 sec      Urinalysis Basic - ( 28 Mar 2021 08:54 )    Color: Colorless / Appearance: Clear / S.010 / pH: x  Gluc: x / Ketone: Trace  / Bili: Negative / Urobili: <2 mg/dL   Blood: x / Protein: 100 mg/dL / Nitrite: Negative   Leuk Esterase: Negative / RBC: 2 /HPF / WBC 1 /HPF   Sq Epi: x / Non Sq Epi: 0 /HPF / Bacteria: Negative          RADIOLOGY & ADDITIONAL TESTS:  Results Reviewed:   < from: CT Angio Head w/ IV Cont (21 @ 08:17) >    FINDINGS:   No previous examinations are available for review.    ET tube and NG tube are in place.    The carotid circulation is intact without hemodynamically significant stenosis.   The vertebral arteries are patent.    Intracranial vertebral arteries are intact without evidence of dissection or hemodynamically significant stenosis. There is mild narrowing of the distal basilar artery secondary to compression from mass effect from brain edema, however remains patent. There may be mild narrowingof the bilateral posterior cerebral arteries secondary to mass effect versus vasospasm, however remain patent.    The intracranial internal carotid arteries, middle cerebral arteries, and anterior cerebral arteries are intact without hemodynamically significant stenosis.    There is no evidence of aneurysm or vascular malformation.      IMPRESSION:        1.   Right carotid system:  No hemodynamically significant stenosis.        2.   Left carotid system:  No hemodynamically significant stenosis.        3.   Intracranial circulation: No aneurysm or AVM. There is mild narrowing of the distal basilar artery secondary to compression from mass effect from brain edema, however remains patent. There may be mild narrowing of the bilateral posterior cerebral arteries secondary to mass effect versus vasospasm, however remain patent.            < end of copied text >    < from: CT Brain Stroke Protocol (21 @ 08:15) >    IMPRESSION:    Large left-sided intraparenchymal hemorrhage centered in the left basal ganglia with left to right subfalcine herniation, left uncal herniation, and downward herniation, with intraventricular extension of hemorrhage, as detailed above.    Dr. Real discussed these findings with Dr. Osei on 3/28/2021 8:18 AM with read back.        < end of copied text >

## 2021-03-28 NOTE — ED PROVIDER NOTE - PHYSICAL EXAMINATION
General: Well developed, well nourished female, diaphoretic.   HEENT: Normocephalic and atraumatic, pupils 7mm bilaterally, non-reactive. Trachea midline.   Cardiac: Normal S1 and S2 w/ RRR. No MRG.  Pulmonary: CTA bilaterally. laboured breathing, intermittently stridorus   Abdominal: Soft, NTND  Neurologic: Obtunded, not withdrawing from pain.  Musculoskeletal: No limited ROM.  Vascular: Warm and well perfused. Radial pulses 2+ and equal bilaterally   Skin: Color appropriate for race.   Psychiatric: obtunded   Keyshawn Cobb M.D. PGY-3

## 2021-03-28 NOTE — H&P ADULT - NSHPPHYSICALEXAM_GEN_ALL_CORE
Vital Signs Last 24 Hrs  T(C): --  T(F): --  HR: 76 (28 Mar 2021 09:51) (68 - 120)  BP: 121/81 (28 Mar 2021 09:24) (80/60 - 259/143)  BP(mean): 91 (28 Mar 2021 09:24) (62 - 105)  RR: 16 (28 Mar 2021 09:24) (16 - 21)  SpO2: 98% (28 Mar 2021 09:51) (98% - 100%)    PHYSICAL EXAM:  GENERAL: intubated, not responsive to noxious stimuli   HEAD:  Atraumatic, Normocephalic  NECK: Supple, No JVD  CHEST/LUNG: on ventilator   HEART: Regular rate and rhythm; No murmurs, rubs, or gallops  ABDOMEN: Bowel sounds present; Soft, Nontender, Nondistended. No hepatomegaly  EXTREMITIES:  No clubbing, cyanosis, or edema  NERVOUS SYSTEM:  pupils fixed and dilated bilaterally   SKIN: No rashes or lesions

## 2021-03-28 NOTE — ED ADULT TRIAGE NOTE - CHIEF COMPLAINT QUOTE
pt arrives as EMS notification for suspected drug overdose, responds only to painful stimuli, given 2 mg Narcan intranasal. Charge RN made aware, pt directly roomed to [facility].

## 2021-03-29 NOTE — CONSULT NOTE ADULT - SUBJECTIVE AND OBJECTIVE BOX
HPI:  56F BIBEMS for altered mental status. Was doing cocaine and drinking alcohol with her boyfriend. Patient was noted to be normal at 0500 3/28/21 by boyfriend. At 0530, she reportedly developed right sided numbness and facial droop. She became unresponsive shortly therafter. EMS was called and brought to [facility] ED. Per boyfriend, she was using cocaine heavily that night.     In ED minimally responsive to noxious stimuli, unable to protect airway so intubated. Stroke code called, CTH showing large L-sided IPH in left basal ganglia with left to right subfalcine herniation, left uncal herniation, and downward herniation, with intraventricular extension of hemorrhage. NSGY called, recommended no interventions. Advised performing brain death protocol. Initially hypertensive to 200s sys, started on cardene gtt. Subsequently became hypotensive, cardene gtt dc'd and started levo.    (28 Mar 2021 09:51)    Pt unresponsive.  Concern for brain death but unable to perform protocol due to hypothermia.  Family to visit for end of life.     PERTINENT PM/SXH:   No pertinent past medical history      No significant past surgical history      FAMILY HISTORY:    ITEMS NOT CHECKED ARE NOT PRESENT    SOCIAL HISTORY:   Significant other/partner:  [ ]  Children:  [ ]  Mormon/Spirituality:  Substance hx:  [ ]   Tobacco hx:  [ ]   Alcohol hx: [ ]   Home Opioid hx:  [ ] I-Stop Reference No:  Living Situation: [ ]Home  [ ]Long term care  [ ]Rehab [ ]Other    ADVANCE DIRECTIVES:    DNR  MOLST  [ ]  Living Will  [ ]   DECISION MAKER(s):  [ ] Health Care Proxy(s)  [ ] Surrogate(s)  [ ] Guardian           Name(s): Phone Number(s):    BASELINE (I)ADL(s) (prior to admission):  [place]: [ x]Total  [ ] Moderate [ ]Dependent    Allergies    No Known Allergies    Intolerances    MEDICATIONS  (STANDING):  chlorhexidine 0.12% Liquid 15 milliLiter(s) Oral Mucosa every 12 hours  chlorhexidine 4% Liquid 1 Application(s) Topical <User Schedule>  dextrose 5% + lactated ringers. 1000 milliLiter(s) (75 mL/Hr) IV Continuous <Continuous>  norepinephrine Infusion 0.05 MICROgram(s)/kG/Min (6.56 mL/Hr) IV Continuous <Continuous>    MEDICATIONS  (PRN):  sodium chloride 0.9% lock flush 10 milliLiter(s) IV Push every 1 hour PRN Pre/post blood products, medications, blood draw, and to maintain line patency    PRESENT SYMPTOMS: [x ]Unable to obtain due to poor mentation   Source if other than patient:  [ ]Family   [ ]Team     Pain (Impact on QOL):    Location -         Minimal acceptable level (0-10 scale):                    Aggrevating factors -  Quality -  Radiation -  Severity (0-10 scale) -    Timing -    PAIN AD Score:     [email or web address] (press ctrl +  left click to view)    Dyspnea:                           [ ]Mild [ ]Moderate [ ]Severe  Anxiety:                             [ ]Mild [ ]Moderate [ ]Severe  Fatigue:                             [ ]Mild [ ]Moderate [ ]Severe  Nausea:                             [ ]Mild [ ]Moderate [ ]Severe  Loss of appetite:              [ ]Mild [ ]Moderate [ ]Severe  Constipation:                    [ ]Mild [ ]Moderate [ ]Severe    Other Symptoms:  [ ]All other review of systems negative     Karnofsky Performance Score/Palliative Performance Status Version 2:    10     %  PHYSICAL EXAM:  Vital Signs Last 24 Hrs  T(C): 35.2 (29 Mar 2021 14:19), Max: 36.1 (28 Mar 2021 20:00)  T(F): 95.4 (29 Mar 2021 14:19), Max: 96.9 (28 Mar 2021 20:00)  HR: 87 (29 Mar 2021 14:00) (50 - 113)  BP: 95/75 (29 Mar 2021 01:00) (95/75 - 127/81)  BP(mean): 79 (29 Mar 2021 01:00) (79 - 95)  RR: 14 (29 Mar 2021 14:00) (14 - 14)  SpO2: 100% (29 Mar 2021 14:00) (98% - 100%) I&O's Summary    28 Mar 2021 07:01  -  29 Mar 2021 07:00  --------------------------------------------------------  IN: 50.1 mL / OUT: 1795 mL / NET: -1744.9 mL    29 Mar 2021 07:01  -  29 Mar 2021 14:20  --------------------------------------------------------  IN: 159.2 mL / OUT: 40 mL / NET: 119.2 mL    GENERAL:  [ ]Alert  [ ]Oriented x   [ ]Lethargic  [ ]Cachexia  [ ]Unarousable  [ ]Verbal  [ x]Non-Verbal  Behavioral:   [ ] Anxiety  [ ] Delirium [ ] Agitation [ ] Other  HEENT:  [ ]Normal   [ ]Dry mouth   [ x]ET Tube/Trach  [ ]Oral lesions  PULMONARY:   [x ]Clear [ ]Tachypnea  [ ]Audible excessive secretions   [ ]Rhonchi        [ ]Right [ ]Left [ ]Bilateral  [ ]Crackles        [ ]Right [ ]Left [ ]Bilateral  [ ]Wheezing     [ ]Right [ ]Left [ ]Bilateral  CARDIOVASCULAR:    [x ]Regular [ ]Irregular [ ]Tachy  [ ]Alex [ ]Murmur [ ]Other  GASTROINTESTINAL:  [x ]Soft  [ ]Distended   [ ]+BS  [ ]Non tender [ ]Tender  [ ]PEG [x ]OGT/ NGT  Last BM:   GENITOURINARY:  [ ]Normal [ ] Incontinent   [ ]Oliguria/Anuria   [ x]Noel  MUSCULOSKELETAL:   [ ]Normal   [ ]Weakness  [x ]Bed/Wheelchair bound [ ]Edema  NEUROLOGIC:   [ ]No focal deficits  [ ] Cognitive impairment  [ ] Dysphagia [ ]Dysarthria [ ] Paresis [x ]Other no gag, no reflexes  SKIN:   [ z]Normal   [ ]Pressure ulcer(s)  [ ]Rash    CRITICAL CARE:  [ ] Shock Present  [ ]Septic [ ]Cardiogenic [ ]Neurologic [ ]Hypovolemic  [ ]  Vasopressors [ ]  Inotropes   [ ] Respiratory failure present  [ ] Acute  [ ] Chronic [ ] Hypoxic  [ ] Hypercarbic [ ] Other  [ ] Other organ failure     LABS:                        13.3   8.14  )-----------( 324      ( 29 Mar 2021 08:43 )             39.7   -    147<H>  |  112<H>  |  20  ----------------------------<  121<H>  3.2<L>   |  24  |  1.22    Ca    9.2      29 Mar 2021 08:46  Phos  3.6       Mg     2.0         TPro  5.9<L>  /  Alb  3.7  /  TBili  0.9  /  DBili  x   /  AST  15  /  ALT  21  /  AlkPhos  112    PT/INR - ( 28 Mar 2021 07:33 )   PT: 11.3 sec;   INR: 0.99 ratio         PTT - ( 28 Mar 2021 07:33 )  PTT:29.4 sec    Urinalysis Basic - ( 28 Mar 2021 08:54 )    Color: Colorless / Appearance: Clear / S.010 / pH: x  Gluc: x / Ketone: Trace  / Bili: Negative / Urobili: <2 mg/dL   Blood: x / Protein: 100 mg/dL / Nitrite: Negative   Leuk Esterase: Negative / RBC: 2 /HPF / WBC 1 /HPF   Sq Epi: x / Non Sq Epi: 0 /HPF / Bacteria: Negative      RADIOLOGY & ADDITIONAL STUDIES:    PROTEIN CALORIE MALNUTRITION:   [ ] PPSV2 < or = to 30% [ ] significant weight loss  [ ] poor nutritional intake [ ] catabolic state [ ] anasarca     Albumin, Serum: 3.7 g/dL (21 @ 13:09)  Artificial Nutrition [ ]     REFERRALS:   [ ]Chaplaincy  [ ] Hospice  [ ]Child Life  [ ]Social Work  [ ]Case management [ ]Holistic Therapy   Goals of Care Discussion Document:

## 2021-03-29 NOTE — CONSULT NOTE ADULT - PROBLEM SELECTOR RECOMMENDATION 9
not a surgical candidate  concern for brain death  assessment when able
1. case and images reviewed with attending, Dr. Elliott, due to patients extremely poor prognosis and physical exam c/w clinical brain death, no neurosurgical intervention will be recommended.   2. recommend neurology consult for brain death criteria.

## 2021-03-29 NOTE — CONSULT NOTE ADULT - ASSESSMENT
55 yo female with large L-sided IPH in left basal ganglia with left to right subfalcine herniation, left uncal herniation, and downward herniation, with intraventricular extension of hemorrhage.  Asked to see for goc

## 2021-03-29 NOTE — CONSULT NOTE ADULT - PROBLEM SELECTOR RECOMMENDATION 3
overall prognosis is grave  family to visit today  if pt is still alive in am will reach out to family   would not escalate care as prognosis is grave

## 2021-03-30 LAB
A1C WITH ESTIMATED AVERAGE GLUCOSE RESULT: 5.1 % — SIGNIFICANT CHANGE UP (ref 4–5.6)
ALBUMIN SERPL ELPH-MCNC: 3.1 G/DL — LOW (ref 3.3–5)
ALBUMIN SERPL ELPH-MCNC: 3.2 G/DL — LOW (ref 3.3–5)
ALBUMIN SERPL ELPH-MCNC: 3.2 G/DL — LOW (ref 3.3–5)
ALBUMIN SERPL ELPH-MCNC: 3.3 G/DL — SIGNIFICANT CHANGE UP (ref 3.3–5)
ALP SERPL-CCNC: 110 U/L — SIGNIFICANT CHANGE UP (ref 40–120)
ALP SERPL-CCNC: 113 U/L — SIGNIFICANT CHANGE UP (ref 40–120)
ALP SERPL-CCNC: 118 U/L — SIGNIFICANT CHANGE UP (ref 40–120)
ALP SERPL-CCNC: 119 U/L — SIGNIFICANT CHANGE UP (ref 40–120)
ALT FLD-CCNC: 15 U/L — SIGNIFICANT CHANGE UP (ref 4–33)
ALT FLD-CCNC: 15 U/L — SIGNIFICANT CHANGE UP (ref 4–33)
ALT FLD-CCNC: 18 U/L — SIGNIFICANT CHANGE UP (ref 4–33)
ALT FLD-CCNC: 18 U/L — SIGNIFICANT CHANGE UP (ref 4–33)
AMYLASE P1 CFR SERPL: 135 U/L — HIGH (ref 25–125)
AMYLASE P1 CFR SERPL: 219 U/L — HIGH (ref 25–125)
AMYLASE P1 CFR SERPL: 235 U/L — HIGH (ref 25–125)
AMYLASE P1 CFR SERPL: 241 U/L — HIGH (ref 25–125)
ANION GAP SERPL CALC-SCNC: 10 MMOL/L — SIGNIFICANT CHANGE UP (ref 7–14)
ANION GAP SERPL CALC-SCNC: 11 MMOL/L — SIGNIFICANT CHANGE UP (ref 7–14)
ANION GAP SERPL CALC-SCNC: 11 MMOL/L — SIGNIFICANT CHANGE UP (ref 7–14)
ANION GAP SERPL CALC-SCNC: 9 MMOL/L — SIGNIFICANT CHANGE UP (ref 7–14)
APPEARANCE UR: CLEAR — SIGNIFICANT CHANGE UP
APTT BLD: 32.1 SEC — SIGNIFICANT CHANGE UP (ref 27–36.3)
APTT BLD: 34.2 SEC — SIGNIFICANT CHANGE UP (ref 27–36.3)
APTT BLD: 34.7 SEC — SIGNIFICANT CHANGE UP (ref 27–36.3)
APTT BLD: 35.4 SEC — SIGNIFICANT CHANGE UP (ref 27–36.3)
AST SERPL-CCNC: 11 U/L — SIGNIFICANT CHANGE UP (ref 4–32)
AST SERPL-CCNC: 15 U/L — SIGNIFICANT CHANGE UP (ref 4–32)
AST SERPL-CCNC: 18 U/L — SIGNIFICANT CHANGE UP (ref 4–32)
AST SERPL-CCNC: 9 U/L — SIGNIFICANT CHANGE UP (ref 4–32)
BASOPHILS # BLD AUTO: 0.01 K/UL — SIGNIFICANT CHANGE UP (ref 0–0.2)
BASOPHILS # BLD AUTO: 0.01 K/UL — SIGNIFICANT CHANGE UP (ref 0–0.2)
BASOPHILS # BLD AUTO: 0.02 K/UL — SIGNIFICANT CHANGE UP (ref 0–0.2)
BASOPHILS # BLD AUTO: 0.17 K/UL — SIGNIFICANT CHANGE UP (ref 0–0.2)
BASOPHILS NFR BLD AUTO: 0.1 % — SIGNIFICANT CHANGE UP (ref 0–2)
BASOPHILS NFR BLD AUTO: 0.9 % — SIGNIFICANT CHANGE UP (ref 0–2)
BILIRUB DIRECT SERPL-MCNC: <0.2 MG/DL — SIGNIFICANT CHANGE UP (ref 0–0.2)
BILIRUB SERPL-MCNC: 0.4 MG/DL — SIGNIFICANT CHANGE UP (ref 0.2–1.2)
BILIRUB SERPL-MCNC: 0.5 MG/DL — SIGNIFICANT CHANGE UP (ref 0.2–1.2)
BILIRUB SERPL-MCNC: 0.5 MG/DL — SIGNIFICANT CHANGE UP (ref 0.2–1.2)
BILIRUB SERPL-MCNC: 0.7 MG/DL — SIGNIFICANT CHANGE UP (ref 0.2–1.2)
BILIRUB UR-MCNC: NEGATIVE — SIGNIFICANT CHANGE UP
BLOOD GAS ARTERIAL COMPREHENSIVE RESULT: SIGNIFICANT CHANGE UP
BUN SERPL-MCNC: 16 MG/DL — SIGNIFICANT CHANGE UP (ref 7–23)
BUN SERPL-MCNC: 16 MG/DL — SIGNIFICANT CHANGE UP (ref 7–23)
BUN SERPL-MCNC: 20 MG/DL — SIGNIFICANT CHANGE UP (ref 7–23)
BUN SERPL-MCNC: 20 MG/DL — SIGNIFICANT CHANGE UP (ref 7–23)
CALCIUM SERPL-MCNC: 8.9 MG/DL — SIGNIFICANT CHANGE UP (ref 8.4–10.5)
CALCIUM SERPL-MCNC: 9 MG/DL — SIGNIFICANT CHANGE UP (ref 8.4–10.5)
CALCIUM SERPL-MCNC: 9.2 MG/DL — SIGNIFICANT CHANGE UP (ref 8.4–10.5)
CALCIUM SERPL-MCNC: 9.2 MG/DL — SIGNIFICANT CHANGE UP (ref 8.4–10.5)
CHLORIDE SERPL-SCNC: 120 MMOL/L — HIGH (ref 98–107)
CHLORIDE SERPL-SCNC: 122 MMOL/L — HIGH (ref 98–107)
CK MB BLD-MCNC: 13.8 % — HIGH (ref 0–2.5)
CK MB BLD-MCNC: 15.8 % — HIGH (ref 0–2.5)
CK MB BLD-MCNC: 16 % — HIGH (ref 0–2.5)
CK MB BLD-MCNC: 16.8 % — HIGH (ref 0–2.5)
CK MB CFR SERPL CALC: 12.5 NG/ML — HIGH
CK MB CFR SERPL CALC: 5.1 NG/ML — HIGH
CK MB CFR SERPL CALC: 6.3 NG/ML — HIGH
CK MB CFR SERPL CALC: 9.9 NG/ML — HIGH
CK SERPL-CCNC: 37 U/L — SIGNIFICANT CHANGE UP (ref 25–170)
CK SERPL-CCNC: 40 U/L — SIGNIFICANT CHANGE UP (ref 25–170)
CK SERPL-CCNC: 59 U/L — SIGNIFICANT CHANGE UP (ref 25–170)
CK SERPL-CCNC: 78 U/L — SIGNIFICANT CHANGE UP (ref 25–170)
CO2 SERPL-SCNC: 22 MMOL/L — SIGNIFICANT CHANGE UP (ref 22–31)
CO2 SERPL-SCNC: 23 MMOL/L — SIGNIFICANT CHANGE UP (ref 22–31)
CO2 SERPL-SCNC: 23 MMOL/L — SIGNIFICANT CHANGE UP (ref 22–31)
CO2 SERPL-SCNC: 24 MMOL/L — SIGNIFICANT CHANGE UP (ref 22–31)
COLOR SPEC: COLORLESS — SIGNIFICANT CHANGE UP
CREAT SERPL-MCNC: 0.94 MG/DL — SIGNIFICANT CHANGE UP (ref 0.5–1.3)
CREAT SERPL-MCNC: 1.07 MG/DL — SIGNIFICANT CHANGE UP (ref 0.5–1.3)
CREAT SERPL-MCNC: 1.19 MG/DL — SIGNIFICANT CHANGE UP (ref 0.5–1.3)
CREAT SERPL-MCNC: 1.26 MG/DL — SIGNIFICANT CHANGE UP (ref 0.5–1.3)
DIFF PNL FLD: NEGATIVE — SIGNIFICANT CHANGE UP
EOSINOPHIL # BLD AUTO: 0 K/UL — SIGNIFICANT CHANGE UP (ref 0–0.5)
EOSINOPHIL NFR BLD AUTO: 0 % — SIGNIFICANT CHANGE UP (ref 0–6)
ESTIMATED AVERAGE GLUCOSE: 100 MG/DL — SIGNIFICANT CHANGE UP (ref 68–114)
FIBRINOGEN PPP-MCNC: 644 MG/DL — HIGH (ref 290–520)
FIBRINOGEN PPP-MCNC: 746 MG/DL — HIGH (ref 290–520)
FIBRINOGEN PPP-MCNC: 750 MG/DL — HIGH (ref 290–520)
FIBRINOGEN PPP-MCNC: 826 MG/DL — HIGH (ref 290–520)
GGT SERPL-CCNC: 24 U/L — SIGNIFICANT CHANGE UP (ref 5–36)
GGT SERPL-CCNC: 25 U/L — SIGNIFICANT CHANGE UP (ref 5–36)
GGT SERPL-CCNC: 25 U/L — SIGNIFICANT CHANGE UP (ref 5–36)
GLUCOSE BLDC GLUCOMTR-MCNC: 172 MG/DL — HIGH (ref 70–99)
GLUCOSE BLDC GLUCOMTR-MCNC: 198 MG/DL — HIGH (ref 70–99)
GLUCOSE BLDC GLUCOMTR-MCNC: 207 MG/DL — HIGH (ref 70–99)
GLUCOSE BLDC GLUCOMTR-MCNC: 288 MG/DL — HIGH (ref 70–99)
GLUCOSE SERPL-MCNC: 202 MG/DL — HIGH (ref 70–99)
GLUCOSE SERPL-MCNC: 217 MG/DL — HIGH (ref 70–99)
GLUCOSE SERPL-MCNC: 263 MG/DL — HIGH (ref 70–99)
GLUCOSE SERPL-MCNC: 270 MG/DL — HIGH (ref 70–99)
GLUCOSE UR QL: NEGATIVE — SIGNIFICANT CHANGE UP
HCG SERPL-ACNC: <5 MIU/ML — SIGNIFICANT CHANGE UP
HCT VFR BLD CALC: 37.4 % — SIGNIFICANT CHANGE UP (ref 34.5–45)
HCT VFR BLD CALC: 38.8 % — SIGNIFICANT CHANGE UP (ref 34.5–45)
HCT VFR BLD CALC: 40.4 % — SIGNIFICANT CHANGE UP (ref 34.5–45)
HCT VFR BLD CALC: 40.8 % — SIGNIFICANT CHANGE UP (ref 34.5–45)
HGB BLD-MCNC: 11.4 G/DL — LOW (ref 11.5–15.5)
HGB BLD-MCNC: 12.1 G/DL — SIGNIFICANT CHANGE UP (ref 11.5–15.5)
HGB BLD-MCNC: 12.6 G/DL — SIGNIFICANT CHANGE UP (ref 11.5–15.5)
HGB BLD-MCNC: 13.1 G/DL — SIGNIFICANT CHANGE UP (ref 11.5–15.5)
IANC: 13.67 K/UL — HIGH (ref 1.5–8.5)
IANC: 16.29 K/UL — HIGH (ref 1.5–8.5)
IANC: 16.39 K/UL — HIGH (ref 1.5–8.5)
IANC: 17.76 K/UL — HIGH (ref 1.5–8.5)
IMM GRANULOCYTES NFR BLD AUTO: 0.7 % — SIGNIFICANT CHANGE UP (ref 0–1.5)
IMM GRANULOCYTES NFR BLD AUTO: 1.4 % — SIGNIFICANT CHANGE UP (ref 0–1.5)
IMM GRANULOCYTES NFR BLD AUTO: 1.4 % — SIGNIFICANT CHANGE UP (ref 0–1.5)
INR BLD: 1.14 RATIO — SIGNIFICANT CHANGE UP (ref 0.88–1.16)
INR BLD: 1.18 RATIO — HIGH (ref 0.88–1.16)
INR BLD: 1.25 RATIO — HIGH (ref 0.88–1.16)
INR BLD: 1.25 RATIO — HIGH (ref 0.88–1.16)
KETONES UR-MCNC: NEGATIVE — SIGNIFICANT CHANGE UP
LDH SERPL L TO P-CCNC: 182 U/L — SIGNIFICANT CHANGE UP (ref 135–225)
LDH SERPL L TO P-CCNC: 187 U/L — SIGNIFICANT CHANGE UP (ref 135–225)
LDH SERPL L TO P-CCNC: 207 U/L — SIGNIFICANT CHANGE UP (ref 135–225)
LEUKOCYTE ESTERASE UR-ACNC: NEGATIVE — SIGNIFICANT CHANGE UP
LIDOCAIN IGE QN: 22 U/L — SIGNIFICANT CHANGE UP (ref 7–60)
LIDOCAIN IGE QN: 28 U/L — SIGNIFICANT CHANGE UP (ref 7–60)
LIDOCAIN IGE QN: 42 U/L — SIGNIFICANT CHANGE UP (ref 7–60)
LIDOCAIN IGE QN: 55 U/L — SIGNIFICANT CHANGE UP (ref 7–60)
LYMPHOCYTES # BLD AUTO: 0.45 K/UL — LOW (ref 1–3.3)
LYMPHOCYTES # BLD AUTO: 0.48 K/UL — LOW (ref 1–3.3)
LYMPHOCYTES # BLD AUTO: 0.58 K/UL — LOW (ref 1–3.3)
LYMPHOCYTES # BLD AUTO: 0.58 K/UL — LOW (ref 1–3.3)
LYMPHOCYTES # BLD AUTO: 2.6 % — LOW (ref 13–44)
LYMPHOCYTES # BLD AUTO: 3 % — LOW (ref 13–44)
LYMPHOCYTES # BLD AUTO: 3.1 % — LOW (ref 13–44)
LYMPHOCYTES # BLD AUTO: 3.3 % — LOW (ref 13–44)
MAGNESIUM SERPL-MCNC: 2 MG/DL — SIGNIFICANT CHANGE UP (ref 1.6–2.6)
MAGNESIUM SERPL-MCNC: 2.1 MG/DL — SIGNIFICANT CHANGE UP (ref 1.6–2.6)
MCHC RBC-ENTMCNC: 28.3 PG — SIGNIFICANT CHANGE UP (ref 27–34)
MCHC RBC-ENTMCNC: 28.7 PG — SIGNIFICANT CHANGE UP (ref 27–34)
MCHC RBC-ENTMCNC: 29.1 PG — SIGNIFICANT CHANGE UP (ref 27–34)
MCHC RBC-ENTMCNC: 29.1 PG — SIGNIFICANT CHANGE UP (ref 27–34)
MCHC RBC-ENTMCNC: 30.5 GM/DL — LOW (ref 32–36)
MCHC RBC-ENTMCNC: 31.2 GM/DL — LOW (ref 32–36)
MCHC RBC-ENTMCNC: 31.2 GM/DL — LOW (ref 32–36)
MCHC RBC-ENTMCNC: 32.1 GM/DL — SIGNIFICANT CHANGE UP (ref 32–36)
MCV RBC AUTO: 90.7 FL — SIGNIFICANT CHANGE UP (ref 80–100)
MCV RBC AUTO: 92.2 FL — SIGNIFICANT CHANGE UP (ref 80–100)
MCV RBC AUTO: 92.8 FL — SIGNIFICANT CHANGE UP (ref 80–100)
MCV RBC AUTO: 93.3 FL — SIGNIFICANT CHANGE UP (ref 80–100)
MONOCYTES # BLD AUTO: 0.34 K/UL — SIGNIFICANT CHANGE UP (ref 0–0.9)
MONOCYTES # BLD AUTO: 0.49 K/UL — SIGNIFICANT CHANGE UP (ref 0–0.9)
MONOCYTES # BLD AUTO: 0.5 K/UL — SIGNIFICANT CHANGE UP (ref 0–0.9)
MONOCYTES # BLD AUTO: 0.65 K/UL — SIGNIFICANT CHANGE UP (ref 0–0.9)
MONOCYTES NFR BLD AUTO: 1.9 % — LOW (ref 2–14)
MONOCYTES NFR BLD AUTO: 2.6 % — SIGNIFICANT CHANGE UP (ref 2–14)
MONOCYTES NFR BLD AUTO: 3.3 % — SIGNIFICANT CHANGE UP (ref 2–14)
MONOCYTES NFR BLD AUTO: 3.5 % — SIGNIFICANT CHANGE UP (ref 2–14)
NEUTROPHILS # BLD AUTO: 13.67 K/UL — HIGH (ref 1.8–7.4)
NEUTROPHILS # BLD AUTO: 16.29 K/UL — HIGH (ref 1.8–7.4)
NEUTROPHILS # BLD AUTO: 17.27 K/UL — HIGH (ref 1.8–7.4)
NEUTROPHILS # BLD AUTO: 17.76 K/UL — HIGH (ref 1.8–7.4)
NEUTROPHILS NFR BLD AUTO: 92.8 % — HIGH (ref 43–77)
NEUTROPHILS NFR BLD AUTO: 92.9 % — HIGH (ref 43–77)
NEUTROPHILS NFR BLD AUTO: 93 % — HIGH (ref 43–77)
NEUTROPHILS NFR BLD AUTO: 93.3 % — HIGH (ref 43–77)
NITRITE UR-MCNC: NEGATIVE — SIGNIFICANT CHANGE UP
NRBC # BLD: 0 /100 WBCS — SIGNIFICANT CHANGE UP
NRBC # FLD: 0 K/UL — SIGNIFICANT CHANGE UP
OSMOLALITY UR: 106 MOSM/KG — SIGNIFICANT CHANGE UP (ref 50–1200)
PH UR: 5.5 — SIGNIFICANT CHANGE UP (ref 5–8)
PHOSPHATE SERPL-MCNC: 1.3 MG/DL — LOW (ref 2.5–4.5)
PHOSPHATE SERPL-MCNC: 2.4 MG/DL — LOW (ref 2.5–4.5)
PHOSPHATE SERPL-MCNC: 2.9 MG/DL — SIGNIFICANT CHANGE UP (ref 2.5–4.5)
PHOSPHATE SERPL-MCNC: 3.2 MG/DL — SIGNIFICANT CHANGE UP (ref 2.5–4.5)
PLATELET # BLD AUTO: 307 K/UL — SIGNIFICANT CHANGE UP (ref 150–400)
PLATELET # BLD AUTO: 308 K/UL — SIGNIFICANT CHANGE UP (ref 150–400)
PLATELET # BLD AUTO: 315 K/UL — SIGNIFICANT CHANGE UP (ref 150–400)
PLATELET # BLD AUTO: 380 K/UL — SIGNIFICANT CHANGE UP (ref 150–400)
POTASSIUM SERPL-MCNC: 3.6 MMOL/L — SIGNIFICANT CHANGE UP (ref 3.5–5.3)
POTASSIUM SERPL-MCNC: 3.7 MMOL/L — SIGNIFICANT CHANGE UP (ref 3.5–5.3)
POTASSIUM SERPL-MCNC: 4.1 MMOL/L — SIGNIFICANT CHANGE UP (ref 3.5–5.3)
POTASSIUM SERPL-MCNC: 4.1 MMOL/L — SIGNIFICANT CHANGE UP (ref 3.5–5.3)
POTASSIUM SERPL-SCNC: 3.6 MMOL/L — SIGNIFICANT CHANGE UP (ref 3.5–5.3)
POTASSIUM SERPL-SCNC: 3.7 MMOL/L — SIGNIFICANT CHANGE UP (ref 3.5–5.3)
POTASSIUM SERPL-SCNC: 4.1 MMOL/L — SIGNIFICANT CHANGE UP (ref 3.5–5.3)
POTASSIUM SERPL-SCNC: 4.1 MMOL/L — SIGNIFICANT CHANGE UP (ref 3.5–5.3)
PROT SERPL-MCNC: 6 G/DL — SIGNIFICANT CHANGE UP (ref 6–8.3)
PROT SERPL-MCNC: 6.1 G/DL — SIGNIFICANT CHANGE UP (ref 6–8.3)
PROT SERPL-MCNC: 6.1 G/DL — SIGNIFICANT CHANGE UP (ref 6–8.3)
PROT SERPL-MCNC: 6.2 G/DL — SIGNIFICANT CHANGE UP (ref 6–8.3)
PROT UR-MCNC: NEGATIVE — SIGNIFICANT CHANGE UP
PROTHROM AB SERPL-ACNC: 12.9 SEC — SIGNIFICANT CHANGE UP (ref 10.6–13.6)
PROTHROM AB SERPL-ACNC: 13.4 SEC — SIGNIFICANT CHANGE UP (ref 10.6–13.6)
PROTHROM AB SERPL-ACNC: 14.1 SEC — HIGH (ref 10.6–13.6)
PROTHROM AB SERPL-ACNC: 14.2 SEC — HIGH (ref 10.6–13.6)
RBC # BLD: 4.03 M/UL — SIGNIFICANT CHANGE UP (ref 3.8–5.2)
RBC # BLD: 4.21 M/UL — SIGNIFICANT CHANGE UP (ref 3.8–5.2)
RBC # BLD: 4.33 M/UL — SIGNIFICANT CHANGE UP (ref 3.8–5.2)
RBC # BLD: 4.5 M/UL — SIGNIFICANT CHANGE UP (ref 3.8–5.2)
RBC # FLD: 14.1 % — SIGNIFICANT CHANGE UP (ref 10.3–14.5)
RBC # FLD: 14.1 % — SIGNIFICANT CHANGE UP (ref 10.3–14.5)
RBC # FLD: 14.3 % — SIGNIFICANT CHANGE UP (ref 10.3–14.5)
RBC # FLD: 14.4 % — SIGNIFICANT CHANGE UP (ref 10.3–14.5)
SODIUM SERPL-SCNC: 152 MMOL/L — HIGH (ref 135–145)
SODIUM SERPL-SCNC: 153 MMOL/L — HIGH (ref 135–145)
SODIUM SERPL-SCNC: 154 MMOL/L — HIGH (ref 135–145)
SODIUM SERPL-SCNC: 156 MMOL/L — HIGH (ref 135–145)
SP GR SPEC: 1 — LOW (ref 1.01–1.02)
TROPONIN T, HIGH SENSITIVITY RESULT: 112 NG/L — CRITICAL HIGH
TROPONIN T, HIGH SENSITIVITY RESULT: 63 NG/L — CRITICAL HIGH
TROPONIN T, HIGH SENSITIVITY RESULT: 68 NG/L — CRITICAL HIGH
TROPONIN T, HIGH SENSITIVITY RESULT: 81 NG/L — CRITICAL HIGH
UROBILINOGEN FLD QL: SIGNIFICANT CHANGE UP
WBC # BLD: 14.73 K/UL — HIGH (ref 3.8–10.5)
WBC # BLD: 17.47 K/UL — HIGH (ref 3.8–10.5)
WBC # BLD: 18.57 K/UL — HIGH (ref 3.8–10.5)
WBC # BLD: 19.13 K/UL — HIGH (ref 3.8–10.5)
WBC # FLD AUTO: 14.73 K/UL — HIGH (ref 3.8–10.5)
WBC # FLD AUTO: 17.47 K/UL — HIGH (ref 3.8–10.5)
WBC # FLD AUTO: 18.57 K/UL — HIGH (ref 3.8–10.5)
WBC # FLD AUTO: 19.13 K/UL — HIGH (ref 3.8–10.5)

## 2021-03-30 PROCEDURE — 93460 R&L HRT ART/VENTRICLE ANGIO: CPT | Mod: 26

## 2021-03-30 PROCEDURE — 93306 TTE W/DOPPLER COMPLETE: CPT | Mod: 26

## 2021-03-30 PROCEDURE — 76700 US EXAM ABDOM COMPLETE: CPT | Mod: 26

## 2021-03-30 PROCEDURE — 71250 CT THORAX DX C-: CPT | Mod: 26

## 2021-03-30 PROCEDURE — 71045 X-RAY EXAM CHEST 1 VIEW: CPT | Mod: 26

## 2021-03-30 PROCEDURE — 74176 CT ABD & PELVIS W/O CONTRAST: CPT | Mod: 26

## 2021-03-30 RX ORDER — INSULIN HUMAN 100 [IU]/ML
6 INJECTION, SOLUTION SUBCUTANEOUS
Qty: 100 | Refills: 0 | Status: DISCONTINUED | OUTPATIENT
Start: 2021-03-30 | End: 2021-01-01

## 2021-03-30 RX ORDER — VASOPRESSIN 20 [USP'U]/ML
0.25 INJECTION INTRAVENOUS
Qty: 50 | Refills: 0 | Status: DISCONTINUED | OUTPATIENT
Start: 2021-03-30 | End: 2021-01-01

## 2021-03-30 RX ORDER — LEVOTHYROXINE SODIUM 125 MCG
20 TABLET ORAL ONCE
Refills: 0 | Status: COMPLETED | OUTPATIENT
Start: 2021-03-30 | End: 2021-03-30

## 2021-03-30 RX ORDER — GLUCAGON INJECTION, SOLUTION 0.5 MG/.1ML
1 INJECTION, SOLUTION SUBCUTANEOUS ONCE
Refills: 0 | Status: DISCONTINUED | OUTPATIENT
Start: 2021-03-30 | End: 2021-01-01

## 2021-03-30 RX ORDER — SODIUM CHLORIDE 9 MG/ML
500 INJECTION, SOLUTION INTRAVENOUS ONCE
Refills: 0 | Status: COMPLETED | OUTPATIENT
Start: 2021-03-30 | End: 2021-03-30

## 2021-03-30 RX ORDER — VASOPRESSIN 20 [USP'U]/ML
0.5 INJECTION INTRAVENOUS
Qty: 50 | Refills: 0 | Status: DISCONTINUED | OUTPATIENT
Start: 2021-03-30 | End: 2021-03-30

## 2021-03-30 RX ORDER — SODIUM CHLORIDE 9 MG/ML
1000 INJECTION, SOLUTION INTRAVENOUS
Refills: 0 | Status: DISCONTINUED | OUTPATIENT
Start: 2021-03-30 | End: 2021-01-01

## 2021-03-30 RX ORDER — FUROSEMIDE 40 MG
40 TABLET ORAL ONCE
Refills: 0 | Status: COMPLETED | OUTPATIENT
Start: 2021-03-30 | End: 2021-03-30

## 2021-03-30 RX ORDER — DEXTROSE 50 % IN WATER 50 %
25 SYRINGE (ML) INTRAVENOUS ONCE
Refills: 0 | Status: DISCONTINUED | OUTPATIENT
Start: 2021-03-30 | End: 2021-01-01

## 2021-03-30 RX ORDER — PIPERACILLIN AND TAZOBACTAM 4; .5 G/20ML; G/20ML
3.38 INJECTION, POWDER, LYOPHILIZED, FOR SOLUTION INTRAVENOUS EVERY 6 HOURS
Refills: 0 | Status: DISCONTINUED | OUTPATIENT
Start: 2021-03-30 | End: 2021-03-30

## 2021-03-30 RX ORDER — IPRATROPIUM/ALBUTEROL SULFATE 18-103MCG
1 AEROSOL WITH ADAPTER (GRAM) INHALATION
Refills: 0 | Status: DISCONTINUED | OUTPATIENT
Start: 2021-03-30 | End: 2021-03-30

## 2021-03-30 RX ORDER — INSULIN LISPRO 100/ML
VIAL (ML) SUBCUTANEOUS EVERY 6 HOURS
Refills: 0 | Status: DISCONTINUED | OUTPATIENT
Start: 2021-03-30 | End: 2021-03-31

## 2021-03-30 RX ORDER — LEVOTHYROXINE SODIUM 125 MCG
10 TABLET ORAL
Qty: 200 | Refills: 0 | Status: DISCONTINUED | OUTPATIENT
Start: 2021-03-30 | End: 2021-01-01

## 2021-03-30 RX ORDER — DEXTROSE 50 % IN WATER 50 %
15 SYRINGE (ML) INTRAVENOUS ONCE
Refills: 0 | Status: DISCONTINUED | OUTPATIENT
Start: 2021-03-30 | End: 2021-01-01

## 2021-03-30 RX ORDER — VANCOMYCIN HCL 1 G
1000 VIAL (EA) INTRAVENOUS ONCE
Refills: 0 | Status: COMPLETED | OUTPATIENT
Start: 2021-03-30 | End: 2021-03-30

## 2021-03-30 RX ORDER — VASOPRESSIN 20 [USP'U]/ML
0.25 INJECTION INTRAVENOUS
Qty: 50 | Refills: 0 | Status: DISCONTINUED | OUTPATIENT
Start: 2021-03-30 | End: 2021-03-30

## 2021-03-30 RX ORDER — PIPERACILLIN AND TAZOBACTAM 4; .5 G/20ML; G/20ML
3.38 INJECTION, POWDER, LYOPHILIZED, FOR SOLUTION INTRAVENOUS ONCE
Refills: 0 | Status: COMPLETED | OUTPATIENT
Start: 2021-03-30 | End: 2021-03-30

## 2021-03-30 RX ORDER — CHLORHEXIDINE GLUCONATE 213 G/1000ML
15 SOLUTION TOPICAL EVERY 12 HOURS
Refills: 0 | Status: DISCONTINUED | OUTPATIENT
Start: 2021-03-30 | End: 2021-01-01

## 2021-03-30 RX ORDER — PIPERACILLIN AND TAZOBACTAM 4; .5 G/20ML; G/20ML
3.38 INJECTION, POWDER, LYOPHILIZED, FOR SOLUTION INTRAVENOUS ONCE
Refills: 0 | Status: DISCONTINUED | OUTPATIENT
Start: 2021-03-30 | End: 2021-03-30

## 2021-03-30 RX ORDER — POTASSIUM CHLORIDE 20 MEQ
20 PACKET (EA) ORAL
Refills: 0 | Status: COMPLETED | OUTPATIENT
Start: 2021-03-30 | End: 2021-03-31

## 2021-03-30 RX ORDER — PIPERACILLIN AND TAZOBACTAM 4; .5 G/20ML; G/20ML
3.38 INJECTION, POWDER, LYOPHILIZED, FOR SOLUTION INTRAVENOUS EVERY 8 HOURS
Refills: 0 | Status: DISCONTINUED | OUTPATIENT
Start: 2021-03-30 | End: 2021-01-01

## 2021-03-30 RX ORDER — DEXTROSE 50 % IN WATER 50 %
12.5 SYRINGE (ML) INTRAVENOUS ONCE
Refills: 0 | Status: DISCONTINUED | OUTPATIENT
Start: 2021-03-30 | End: 2021-01-01

## 2021-03-30 RX ORDER — POTASSIUM CHLORIDE 20 MEQ
20 PACKET (EA) ORAL
Refills: 0 | Status: DISCONTINUED | OUTPATIENT
Start: 2021-03-30 | End: 2021-03-30

## 2021-03-30 RX ADMIN — VASOPRESSIN 0.5 UNIT(S)/HR: 20 INJECTION INTRAVENOUS at 08:27

## 2021-03-30 RX ADMIN — Medication 2 DROP(S): at 23:26

## 2021-03-30 RX ADMIN — Medication 40 MILLIGRAM(S): at 19:23

## 2021-03-30 RX ADMIN — Medication 2 DROP(S): at 01:51

## 2021-03-30 RX ADMIN — Medication 2 DROP(S): at 22:12

## 2021-03-30 RX ADMIN — SODIUM CHLORIDE 1000 MILLILITER(S): 9 INJECTION, SOLUTION INTRAVENOUS at 05:10

## 2021-03-30 RX ADMIN — Medication 2: at 17:16

## 2021-03-30 RX ADMIN — Medication 2 DROP(S): at 17:05

## 2021-03-30 RX ADMIN — VASOPRESSIN 0.5 UNIT(S)/HR: 20 INJECTION INTRAVENOUS at 03:12

## 2021-03-30 RX ADMIN — Medication 2 DROP(S): at 08:26

## 2021-03-30 RX ADMIN — Medication 2 DROP(S): at 13:48

## 2021-03-30 RX ADMIN — Medication 1: at 11:39

## 2021-03-30 RX ADMIN — SODIUM CHLORIDE 500 MILLILITER(S): 9 INJECTION, SOLUTION INTRAVENOUS at 07:06

## 2021-03-30 RX ADMIN — Medication 116 MILLIGRAM(S): at 04:31

## 2021-03-30 RX ADMIN — Medication 2 DROP(S): at 21:43

## 2021-03-30 RX ADMIN — INSULIN HUMAN 3 UNIT(S)/HR: 100 INJECTION, SOLUTION SUBCUTANEOUS at 23:25

## 2021-03-30 RX ADMIN — Medication 25 MICROGRAM(S)/HR: at 03:12

## 2021-03-30 RX ADMIN — PIPERACILLIN AND TAZOBACTAM 25 GRAM(S): 4; .5 INJECTION, POWDER, LYOPHILIZED, FOR SOLUTION INTRAVENOUS at 09:39

## 2021-03-30 RX ADMIN — Medication 2 DROP(S): at 04:35

## 2021-03-30 RX ADMIN — PIPERACILLIN AND TAZOBACTAM 200 GRAM(S): 4; .5 INJECTION, POWDER, LYOPHILIZED, FOR SOLUTION INTRAVENOUS at 01:51

## 2021-03-30 RX ADMIN — Medication 50 MILLIEQUIVALENT(S): at 22:43

## 2021-03-30 RX ADMIN — Medication 2 DROP(S): at 11:39

## 2021-03-30 RX ADMIN — CHLORHEXIDINE GLUCONATE 1 APPLICATION(S): 213 SOLUTION TOPICAL at 05:16

## 2021-03-30 RX ADMIN — Medication 250 MILLIGRAM(S): at 22:12

## 2021-03-30 RX ADMIN — INSULIN HUMAN 1.5 UNIT(S)/HR: 100 INJECTION, SOLUTION SUBCUTANEOUS at 22:12

## 2021-03-30 RX ADMIN — PIPERACILLIN AND TAZOBACTAM 25 GRAM(S): 4; .5 INJECTION, POWDER, LYOPHILIZED, FOR SOLUTION INTRAVENOUS at 17:04

## 2021-03-30 RX ADMIN — Medication 25 MICROGRAM(S)/HR: at 08:27

## 2021-03-30 RX ADMIN — Medication 20 MICROGRAM(S): at 03:11

## 2021-03-30 RX ADMIN — CHLORHEXIDINE GLUCONATE 15 MILLILITER(S): 213 SOLUTION TOPICAL at 05:15

## 2021-03-30 RX ADMIN — CHLORHEXIDINE GLUCONATE 15 MILLILITER(S): 213 SOLUTION TOPICAL at 17:04

## 2021-03-30 RX ADMIN — Medication 2 DROP(S): at 05:15

## 2021-03-30 RX ADMIN — VASOPRESSIN 0.25 UNIT(S)/HR: 20 INJECTION INTRAVENOUS at 23:25

## 2021-03-30 RX ADMIN — Medication 25 MICROGRAM(S)/HR: at 22:43

## 2021-03-30 RX ADMIN — Medication 1: at 05:14

## 2021-03-30 RX ADMIN — Medication 2 DROP(S): at 09:39

## 2021-03-30 NOTE — CHART NOTE - NSCHARTNOTEFT_GEN_A_CORE
Pre-Bronchoscopy Tuberculosis Risk Screening Tool  To reduce the risk for airborne transmission of Mycobacterium tuberculosis, this assessment form must be completed prior to bronchoscopy procedures being performed outside of a negative pressure environment.    Procedure Date & Time: 3/30/2021 1400d  Health Care Provider Name: Olivia  Reason for the Bronchoscopy: organ donation     Planned Location for the Procedure:  [ ] Emergency Department     [x ] Intensive Care Unit     [ ] Operating Room     Other: ___________________    Risk Assessment  I. I have personally evaluated this patient for Mycobacterium tuberculosis and I determined the following risk:  [x ] Low risk for TB     [ ] Significant risk for TB    II. Additional Findings: _________________________    III. Based on the Determined Risk for TB the following Action(s) are Suggested:  If there is a significant risk for tuberculosis infection, the following recommendations should be followed:            a. Perform the procedure in a negative pressure room, with N95 respirator.            b. If not feasible to move the patient or defer the procedure:                    i. Use a single-bedded room low traffic area to perform the bronchoscopy procedure.                   ii. Place a portable high-efficiency particulate air (HEPA) filter in the space prior to starting the procedure and keep closed.                       Refer to the INF.1132 titled “Tuberculosis Control Strategy Plan” for additional information.                  iii. All Health Care Providers within the procedure room shall wear an N95 respirator.            c. Documentation of the tuberculosis risk assessment should be included within the patient’s medical record.      Bronchoscopy Procedure Note:  Indication: organ donation     Operators: Dr. 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 ETT noted to be in good position. Airway evaluation revealed white to yellow thick mucus in the right main bronchus. The mucus was able to be easily suctioned. Rest of inspection of the bronchial airway tree was negative for Foreign bodies, endobronchial lesions, mucosal changes, atelectasis.  BAL was performed and total 20ml of normal saline was used. Bronchoscope then withdrawn from ETT.    Specimens: Gram stain, culture and fungal culture Pre-Bronchoscopy Tuberculosis Risk Screening Tool  To reduce the risk for airborne transmission of Mycobacterium tuberculosis, this assessment form must be completed prior to bronchoscopy procedures being performed outside of a negative pressure environment.    Procedure Date & Time: 3/30/2021 1400d  Health Care Provider Name: OtilioJose  Reason for the Bronchoscopy: organ donation     Planned Location for the Procedure:  [ ] Emergency Department     [x ] Intensive Care Unit     [ ] Operating Room     Other: ___________________    Risk Assessment  I. I have personally evaluated this patient for Mycobacterium tuberculosis and I determined the following risk:  [x ] Low risk for TB     [ ] Significant risk for TB    II. Additional Findings: _________________________    III. Based on the Determined Risk for TB the following Action(s) are Suggested:  If there is a significant risk for tuberculosis infection, the following recommendations should be followed:            a. Perform the procedure in a negative pressure room, with N95 respirator.            b. If not feasible to move the patient or defer the procedure:                    i. Use a single-bedded room low traffic area to perform the bronchoscopy procedure.                   ii. Place a portable high-efficiency particulate air (HEPA) filter in the space prior to starting the procedure and keep closed.                       Refer to the INF.1132 titled “Tuberculosis Control Strategy Plan” for additional information.                  iii. All Health Care Providers within the procedure room shall wear an N95 respirator.            c. Documentation of the tuberculosis risk assessment should be included within the patient’s medical record.      Bronchoscopy Procedure Note:  Indication: organ donation     Operators: Dr. 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 ETT noted to be in good position. Airway evaluation revealed white to yellow thick mucus in the right main bronchus. The mucus was able to be easily suctioned. Rest of inspection of the bronchial airway tree was negative for Foreign bodies, endobronchial lesions, mucosal changes, atelectasis.  BAL was performed in the L and total 20ml of normal saline was used. Bronchoscope then withdrawn from ETT.    Specimens: Gram stain, culture and fungal culture

## 2021-03-30 NOTE — DISCHARGE NOTE FOR THE EXPIRED PATIENT - HOSPITAL COURSE
56F BIBEMS for altered mental status. Was doing cocaine and drinking alcohol with her boyfriend. Patient was noted to be normal at 0500 3/28/21 by boyfriend. At 0530, she reportedly developed right sided numbness and facial droop. She became unresponsive shortly therafter. EMS was called and brought to [facility] ED. Per boyfriend, she was using cocaine heavily that night.     In ED minimally responsive to noxious stimuli, unable to protect airway so intubated. Stroke code called, CTH showing large L-sided IPH in left basal ganglia with left to right subfalcine herniation, left uncal herniation, and downward herniation, with intraventricular extension of hemorrhage. NSGY called, recommended no interventions. Advised performing brain death protocol. Initially hypertensive to 200s sys, started on cardene gtt. Subsequently became hypotensive, cardene gtt dc'd and started levo.  Patients core temperature 36 degrees patient placed on bear hugger until temperature greater than 36.6 degrees C.  Patients family notified- patients family consented to organ donation- live on at bedside  Brain death protocol completed patient declared brain dead on 3/29/21 at 2228.

## 2021-03-30 NOTE — CHART NOTE - NSCHARTNOTEFT_GEN_A_CORE
Brain death checklist performed on patient.  Patient off of any medications which could confound examination.  Core temperature 36.6 degrees, patient with absent response to pain.  Pupils fixed and nonreactive, absent oculocephalic reflex.  No deviation of eyes to cold caloric testing.  Absent corneal reflexes no response to stimulation to posterior pharynx and no cough to suctioning.  Apnea tested performed with pCO2> 60 above baseline with absent respirations.  Patient declared brain dead at 2228 3/29/21.       Lissy Walters PA-C  [facility] [id number]

## 2021-03-30 NOTE — DISCHARGE NOTE FOR THE EXPIRED PATIENT - OTHER SIGNIFICANT FINDINGS
EXAM:  CT BRAIN STROKE PROTOCOL        PROCEDURE DATE:  Mar 28 2021         INTERPRETATION:  CLINICAL INDICATION: Stroke code.    Technique: Noncontrast CT of the head was performed.    Multiple contiguous axial images were acquired from the skull base to the vertex without the administration of intravenous contrast. Coronal and sagittal reformations were made.    COMPARISON: No similar studies available for comparison.    FINDINGS:    There is a large acute intraparenchymal hematoma centered within the left basal ganglia measuring approximately 7.0 cm AP by 4.8 cm TR. There is a large degree of intraventricular extension of hemorrhage. There is a large degree of surrounding vasogenic edema resulting in mass effect on the surrounding parenchyma. There is diffuse effacement of the sulci throughout the bilateral cerebri, compatible with extensive brain edema. There is left to right midline shift of approximately 1.1 cm. There is effacement of all of the basilar cisterns with left to rightsubfalcine shift, left-sided uncal herniation and marked downward herniation through the foramen magnum.    IMPRESSION:    Large left-sided intraparenchymal hemorrhage centered in the left basal ganglia with left to right subfalcine herniation, left uncal herniation, and downward herniation, with intraventricular extension of hemorrhage, as detailed above.    Dr. Real discussed these findings with Dr. Osei on 3/28/2021 8:18 AM with read back.            LUC SANTACRUZ MD; Resident Radiology  This document has been electronically signed.  ERNESTO HINOJOSA MD; Attending Radiologist  This document has been electronically signed. Mar 28 2021  8:35AM

## 2021-03-31 LAB
ALBUMIN SERPL ELPH-MCNC: 3.1 G/DL — LOW (ref 3.3–5)
ALBUMIN SERPL ELPH-MCNC: 3.3 G/DL — SIGNIFICANT CHANGE UP (ref 3.3–5)
ALBUMIN SERPL ELPH-MCNC: 3.4 G/DL — SIGNIFICANT CHANGE UP (ref 3.3–5)
ALBUMIN SERPL ELPH-MCNC: 3.8 G/DL — SIGNIFICANT CHANGE UP (ref 3.3–5)
ALP SERPL-CCNC: 100 U/L — SIGNIFICANT CHANGE UP (ref 40–120)
ALP SERPL-CCNC: 107 U/L — SIGNIFICANT CHANGE UP (ref 40–120)
ALP SERPL-CCNC: 107 U/L — SIGNIFICANT CHANGE UP (ref 40–120)
ALP SERPL-CCNC: 115 U/L — SIGNIFICANT CHANGE UP (ref 40–120)
ALT FLD-CCNC: 12 U/L — SIGNIFICANT CHANGE UP (ref 4–33)
ALT FLD-CCNC: 14 U/L — SIGNIFICANT CHANGE UP (ref 4–33)
ALT FLD-CCNC: 14 U/L — SIGNIFICANT CHANGE UP (ref 4–33)
ALT FLD-CCNC: 15 U/L — SIGNIFICANT CHANGE UP (ref 4–33)
AMYLASE P1 CFR SERPL: 175 U/L — HIGH (ref 25–125)
AMYLASE P1 CFR SERPL: 214 U/L — HIGH (ref 25–125)
AMYLASE P1 CFR SERPL: 220 U/L — HIGH (ref 25–125)
AMYLASE P1 CFR SERPL: 221 U/L — HIGH (ref 25–125)
ANION GAP SERPL CALC-SCNC: 11 MMOL/L — SIGNIFICANT CHANGE UP (ref 7–14)
ANION GAP SERPL CALC-SCNC: 12 MMOL/L — SIGNIFICANT CHANGE UP (ref 7–14)
ANION GAP SERPL CALC-SCNC: 14 MMOL/L — SIGNIFICANT CHANGE UP (ref 7–14)
ANION GAP SERPL CALC-SCNC: 9 MMOL/L — SIGNIFICANT CHANGE UP (ref 7–14)
APPEARANCE UR: ABNORMAL
APTT BLD: 29.9 SEC — SIGNIFICANT CHANGE UP (ref 27–36.3)
APTT BLD: 30.8 SEC — SIGNIFICANT CHANGE UP (ref 27–36.3)
APTT BLD: 30.9 SEC — SIGNIFICANT CHANGE UP (ref 27–36.3)
APTT BLD: 31.9 SEC — SIGNIFICANT CHANGE UP (ref 27–36.3)
AST SERPL-CCNC: 12 U/L — SIGNIFICANT CHANGE UP (ref 4–32)
AST SERPL-CCNC: 13 U/L — SIGNIFICANT CHANGE UP (ref 4–32)
AST SERPL-CCNC: 13 U/L — SIGNIFICANT CHANGE UP (ref 4–32)
AST SERPL-CCNC: 8 U/L — SIGNIFICANT CHANGE UP (ref 4–32)
BASE EXCESS BLDA CALC-SCNC: -1.2 MMOL/L — SIGNIFICANT CHANGE UP (ref -2–2)
BASOPHILS # BLD AUTO: 0.02 K/UL — SIGNIFICANT CHANGE UP (ref 0–0.2)
BASOPHILS # BLD AUTO: 0.04 K/UL — SIGNIFICANT CHANGE UP (ref 0–0.2)
BASOPHILS NFR BLD AUTO: 0.1 % — SIGNIFICANT CHANGE UP (ref 0–2)
BASOPHILS NFR BLD AUTO: 0.2 % — SIGNIFICANT CHANGE UP (ref 0–2)
BILIRUB DIRECT SERPL-MCNC: <0.2 MG/DL — SIGNIFICANT CHANGE UP (ref 0–0.2)
BILIRUB INDIRECT FLD-MCNC: >0.2 MG/DL — SIGNIFICANT CHANGE UP (ref 0–1)
BILIRUB SERPL-MCNC: 0.3 MG/DL — SIGNIFICANT CHANGE UP (ref 0.2–1.2)
BILIRUB SERPL-MCNC: 0.4 MG/DL — SIGNIFICANT CHANGE UP (ref 0.2–1.2)
BILIRUB SERPL-MCNC: 0.5 MG/DL — SIGNIFICANT CHANGE UP (ref 0.2–1.2)
BILIRUB UR-MCNC: NEGATIVE — SIGNIFICANT CHANGE UP
BLOOD GAS ARTERIAL COMPREHENSIVE RESULT: SIGNIFICANT CHANGE UP
BUN SERPL-MCNC: 23 MG/DL — SIGNIFICANT CHANGE UP (ref 7–23)
BUN SERPL-MCNC: 23 MG/DL — SIGNIFICANT CHANGE UP (ref 7–23)
BUN SERPL-MCNC: 25 MG/DL — HIGH (ref 7–23)
BUN SERPL-MCNC: 26 MG/DL — HIGH (ref 7–23)
CALCIUM SERPL-MCNC: 9.1 MG/DL — SIGNIFICANT CHANGE UP (ref 8.4–10.5)
CALCIUM SERPL-MCNC: 9.2 MG/DL — SIGNIFICANT CHANGE UP (ref 8.4–10.5)
CALCIUM SERPL-MCNC: 9.3 MG/DL — SIGNIFICANT CHANGE UP (ref 8.4–10.5)
CALCIUM SERPL-MCNC: 9.6 MG/DL — SIGNIFICANT CHANGE UP (ref 8.4–10.5)
CHLORIDE SERPL-SCNC: 119 MMOL/L — HIGH (ref 98–107)
CHLORIDE SERPL-SCNC: 120 MMOL/L — HIGH (ref 98–107)
CHLORIDE SERPL-SCNC: 121 MMOL/L — HIGH (ref 98–107)
CHLORIDE SERPL-SCNC: 122 MMOL/L — HIGH (ref 98–107)
CK MB BLD-MCNC: 11.4 % — HIGH (ref 0–2.5)
CK MB BLD-MCNC: 11.9 % — HIGH (ref 0–2.5)
CK MB BLD-MCNC: 12.2 % — HIGH (ref 0–2.5)
CK MB BLD-MCNC: SIGNIFICANT CHANGE UP % (ref 0–2.5)
CK MB CFR SERPL CALC: 3.8 NG/ML — SIGNIFICANT CHANGE UP
CK MB CFR SERPL CALC: 4 NG/ML — SIGNIFICANT CHANGE UP
CK MB CFR SERPL CALC: 5 NG/ML — HIGH
CK MB CFR SERPL CALC: 5 NG/ML — HIGH
CK SERPL-CCNC: 31 U/L — SIGNIFICANT CHANGE UP (ref 25–170)
CK SERPL-CCNC: 35 U/L — SIGNIFICANT CHANGE UP (ref 25–170)
CK SERPL-CCNC: 41 U/L — SIGNIFICANT CHANGE UP (ref 25–170)
CK SERPL-CCNC: 42 U/L — SIGNIFICANT CHANGE UP (ref 25–170)
CO2 SERPL-SCNC: 21 MMOL/L — LOW (ref 22–31)
CO2 SERPL-SCNC: 22 MMOL/L — SIGNIFICANT CHANGE UP (ref 22–31)
CO2 SERPL-SCNC: 22 MMOL/L — SIGNIFICANT CHANGE UP (ref 22–31)
CO2 SERPL-SCNC: 23 MMOL/L — SIGNIFICANT CHANGE UP (ref 22–31)
COLOR SPEC: YELLOW — SIGNIFICANT CHANGE UP
CREAT SERPL-MCNC: 1.5 MG/DL — HIGH (ref 0.5–1.3)
CREAT SERPL-MCNC: 1.64 MG/DL — HIGH (ref 0.5–1.3)
CREAT SERPL-MCNC: 1.75 MG/DL — HIGH (ref 0.5–1.3)
CREAT SERPL-MCNC: 1.88 MG/DL — HIGH (ref 0.5–1.3)
DIFF PNL FLD: ABNORMAL
EOSINOPHIL # BLD AUTO: 0 K/UL — SIGNIFICANT CHANGE UP (ref 0–0.5)
EOSINOPHIL NFR BLD AUTO: 0 % — SIGNIFICANT CHANGE UP (ref 0–6)
FIBRINOGEN PPP-MCNC: 789 MG/DL — HIGH (ref 290–520)
FIBRINOGEN PPP-MCNC: 832 MG/DL — HIGH (ref 290–520)
FIBRINOGEN PPP-MCNC: 873 MG/DL — HIGH (ref 290–520)
FIBRINOGEN PPP-MCNC: 911 MG/DL — HIGH (ref 290–520)
GGT SERPL-CCNC: 24 U/L — SIGNIFICANT CHANGE UP (ref 5–36)
GGT SERPL-CCNC: 26 U/L — SIGNIFICANT CHANGE UP (ref 5–36)
GGT SERPL-CCNC: 29 U/L — SIGNIFICANT CHANGE UP (ref 5–36)
GGT SERPL-CCNC: 31 U/L — SIGNIFICANT CHANGE UP (ref 5–36)
GLUCOSE BLDC GLUCOMTR-MCNC: 117 MG/DL — HIGH (ref 70–99)
GLUCOSE BLDC GLUCOMTR-MCNC: 121 MG/DL — HIGH (ref 70–99)
GLUCOSE BLDC GLUCOMTR-MCNC: 121 MG/DL — HIGH (ref 70–99)
GLUCOSE BLDC GLUCOMTR-MCNC: 126 MG/DL — HIGH (ref 70–99)
GLUCOSE BLDC GLUCOMTR-MCNC: 127 MG/DL — HIGH (ref 70–99)
GLUCOSE BLDC GLUCOMTR-MCNC: 127 MG/DL — HIGH (ref 70–99)
GLUCOSE BLDC GLUCOMTR-MCNC: 129 MG/DL — HIGH (ref 70–99)
GLUCOSE BLDC GLUCOMTR-MCNC: 132 MG/DL — HIGH (ref 70–99)
GLUCOSE BLDC GLUCOMTR-MCNC: 135 MG/DL — HIGH (ref 70–99)
GLUCOSE BLDC GLUCOMTR-MCNC: 136 MG/DL — HIGH (ref 70–99)
GLUCOSE BLDC GLUCOMTR-MCNC: 155 MG/DL — HIGH (ref 70–99)
GLUCOSE BLDC GLUCOMTR-MCNC: 156 MG/DL — HIGH (ref 70–99)
GLUCOSE BLDC GLUCOMTR-MCNC: 157 MG/DL — HIGH (ref 70–99)
GLUCOSE BLDC GLUCOMTR-MCNC: 180 MG/DL — HIGH (ref 70–99)
GLUCOSE BLDC GLUCOMTR-MCNC: 188 MG/DL — HIGH (ref 70–99)
GLUCOSE BLDC GLUCOMTR-MCNC: 196 MG/DL — HIGH (ref 70–99)
GLUCOSE BLDC GLUCOMTR-MCNC: 209 MG/DL — HIGH (ref 70–99)
GLUCOSE BLDC GLUCOMTR-MCNC: 269 MG/DL — HIGH (ref 70–99)
GLUCOSE BLDC GLUCOMTR-MCNC: 269 MG/DL — HIGH (ref 70–99)
GLUCOSE SERPL-MCNC: 117 MG/DL — HIGH (ref 70–99)
GLUCOSE SERPL-MCNC: 145 MG/DL — HIGH (ref 70–99)
GLUCOSE SERPL-MCNC: 165 MG/DL — HIGH (ref 70–99)
GLUCOSE SERPL-MCNC: 244 MG/DL — HIGH (ref 70–99)
GLUCOSE UR QL: NEGATIVE — SIGNIFICANT CHANGE UP
GRAM STN FLD: SIGNIFICANT CHANGE UP
HCO3 BLDA-SCNC: 24 MMOL/L — SIGNIFICANT CHANGE UP (ref 22–26)
HCT VFR BLD CALC: 32.5 % — LOW (ref 34.5–45)
HCT VFR BLD CALC: 33.5 % — LOW (ref 34.5–45)
HCT VFR BLD CALC: 35.2 % — SIGNIFICANT CHANGE UP (ref 34.5–45)
HCT VFR BLD CALC: 35.8 % — SIGNIFICANT CHANGE UP (ref 34.5–45)
HGB BLD-MCNC: 10.5 G/DL — LOW (ref 11.5–15.5)
HGB BLD-MCNC: 11 G/DL — LOW (ref 11.5–15.5)
HGB BLD-MCNC: 11 G/DL — LOW (ref 11.5–15.5)
HGB BLD-MCNC: 9.8 G/DL — LOW (ref 11.5–15.5)
IANC: 20.29 K/UL — HIGH (ref 1.5–8.5)
IANC: 21.88 K/UL — HIGH (ref 1.5–8.5)
IANC: 22.22 K/UL — HIGH (ref 1.5–8.5)
IANC: 23.04 K/UL — HIGH (ref 1.5–8.5)
IMM GRANULOCYTES NFR BLD AUTO: 0.9 % — SIGNIFICANT CHANGE UP (ref 0–1.5)
IMM GRANULOCYTES NFR BLD AUTO: 1.2 % — SIGNIFICANT CHANGE UP (ref 0–1.5)
IMM GRANULOCYTES NFR BLD AUTO: 1.6 % — HIGH (ref 0–1.5)
IMM GRANULOCYTES NFR BLD AUTO: 2.7 % — HIGH (ref 0–1.5)
INR BLD: 1.13 RATIO — SIGNIFICANT CHANGE UP (ref 0.88–1.16)
INR BLD: 1.16 RATIO — SIGNIFICANT CHANGE UP (ref 0.88–1.16)
INR BLD: 1.18 RATIO — HIGH (ref 0.88–1.16)
INR BLD: 1.24 RATIO — HIGH (ref 0.88–1.16)
KETONES UR-MCNC: ABNORMAL
LDH SERPL L TO P-CCNC: 179 U/L — SIGNIFICANT CHANGE UP (ref 135–225)
LDH SERPL L TO P-CCNC: 213 U/L — SIGNIFICANT CHANGE UP (ref 135–225)
LDH SERPL L TO P-CCNC: 226 U/L — HIGH (ref 135–225)
LDH SERPL L TO P-CCNC: 254 U/L — HIGH (ref 135–225)
LEUKOCYTE ESTERASE UR-ACNC: NEGATIVE — SIGNIFICANT CHANGE UP
LIDOCAIN IGE QN: 11 U/L — SIGNIFICANT CHANGE UP (ref 7–60)
LIDOCAIN IGE QN: 12 U/L — SIGNIFICANT CHANGE UP (ref 7–60)
LIDOCAIN IGE QN: 17 U/L — SIGNIFICANT CHANGE UP (ref 7–60)
LYMPHOCYTES # BLD AUTO: 0.55 K/UL — LOW (ref 1–3.3)
LYMPHOCYTES # BLD AUTO: 0.65 K/UL — LOW (ref 1–3.3)
LYMPHOCYTES # BLD AUTO: 0.67 K/UL — LOW (ref 1–3.3)
LYMPHOCYTES # BLD AUTO: 0.76 K/UL — LOW (ref 1–3.3)
LYMPHOCYTES # BLD AUTO: 2.2 % — LOW (ref 13–44)
LYMPHOCYTES # BLD AUTO: 2.7 % — LOW (ref 13–44)
LYMPHOCYTES # BLD AUTO: 2.8 % — LOW (ref 13–44)
LYMPHOCYTES # BLD AUTO: 3.4 % — LOW (ref 13–44)
MAGNESIUM SERPL-MCNC: 1.8 MG/DL — SIGNIFICANT CHANGE UP (ref 1.6–2.6)
MAGNESIUM SERPL-MCNC: 1.8 MG/DL — SIGNIFICANT CHANGE UP (ref 1.6–2.6)
MAGNESIUM SERPL-MCNC: 1.9 MG/DL — SIGNIFICANT CHANGE UP (ref 1.6–2.6)
MAGNESIUM SERPL-MCNC: 1.9 MG/DL — SIGNIFICANT CHANGE UP (ref 1.6–2.6)
MCHC RBC-ENTMCNC: 28.5 PG — SIGNIFICANT CHANGE UP (ref 27–34)
MCHC RBC-ENTMCNC: 29.3 PG — SIGNIFICANT CHANGE UP (ref 27–34)
MCHC RBC-ENTMCNC: 29.4 PG — SIGNIFICANT CHANGE UP (ref 27–34)
MCHC RBC-ENTMCNC: 29.6 PG — SIGNIFICANT CHANGE UP (ref 27–34)
MCHC RBC-ENTMCNC: 30.2 GM/DL — LOW (ref 32–36)
MCHC RBC-ENTMCNC: 30.7 GM/DL — LOW (ref 32–36)
MCHC RBC-ENTMCNC: 31.3 GM/DL — LOW (ref 32–36)
MCHC RBC-ENTMCNC: 31.3 GM/DL — LOW (ref 32–36)
MCV RBC AUTO: 93.8 FL — SIGNIFICANT CHANGE UP (ref 80–100)
MCV RBC AUTO: 94.5 FL — SIGNIFICANT CHANGE UP (ref 80–100)
MCV RBC AUTO: 94.6 FL — SIGNIFICANT CHANGE UP (ref 80–100)
MCV RBC AUTO: 95.2 FL — SIGNIFICANT CHANGE UP (ref 80–100)
MONOCYTES # BLD AUTO: 0.76 K/UL — SIGNIFICANT CHANGE UP (ref 0–0.9)
MONOCYTES # BLD AUTO: 0.91 K/UL — HIGH (ref 0–0.9)
MONOCYTES # BLD AUTO: 0.93 K/UL — HIGH (ref 0–0.9)
MONOCYTES # BLD AUTO: 0.99 K/UL — HIGH (ref 0–0.9)
MONOCYTES NFR BLD AUTO: 3.4 % — SIGNIFICANT CHANGE UP (ref 2–14)
MONOCYTES NFR BLD AUTO: 3.8 % — SIGNIFICANT CHANGE UP (ref 2–14)
MONOCYTES NFR BLD AUTO: 3.9 % — SIGNIFICANT CHANGE UP (ref 2–14)
MONOCYTES NFR BLD AUTO: 3.9 % — SIGNIFICANT CHANGE UP (ref 2–14)
NEUTROPHILS # BLD AUTO: 20.29 K/UL — HIGH (ref 1.8–7.4)
NEUTROPHILS # BLD AUTO: 21.88 K/UL — HIGH (ref 1.8–7.4)
NEUTROPHILS # BLD AUTO: 22.22 K/UL — HIGH (ref 1.8–7.4)
NEUTROPHILS # BLD AUTO: 23.04 K/UL — HIGH (ref 1.8–7.4)
NEUTROPHILS NFR BLD AUTO: 91.1 % — HIGH (ref 43–77)
NEUTROPHILS NFR BLD AUTO: 91.6 % — HIGH (ref 43–77)
NEUTROPHILS NFR BLD AUTO: 92.1 % — HIGH (ref 43–77)
NEUTROPHILS NFR BLD AUTO: 92.2 % — HIGH (ref 43–77)
NITRITE UR-MCNC: NEGATIVE — SIGNIFICANT CHANGE UP
NRBC # BLD: 0 /100 WBCS — SIGNIFICANT CHANGE UP
NRBC # FLD: 0 K/UL — SIGNIFICANT CHANGE UP
PCO2 BLDA: 36 MMHG — SIGNIFICANT CHANGE UP (ref 32–48)
PH BLD: 7.42 — SIGNIFICANT CHANGE UP (ref 7.35–7.45)
PH UR: 5 — SIGNIFICANT CHANGE UP (ref 5–8)
PHOSPHATE SERPL-MCNC: 2.2 MG/DL — LOW (ref 2.5–4.5)
PHOSPHATE SERPL-MCNC: 3.5 MG/DL — SIGNIFICANT CHANGE UP (ref 2.5–4.5)
PHOSPHATE SERPL-MCNC: 4 MG/DL — SIGNIFICANT CHANGE UP (ref 2.5–4.5)
PHOSPHATE SERPL-MCNC: 4.5 MG/DL — SIGNIFICANT CHANGE UP (ref 2.5–4.5)
PLATELET # BLD AUTO: 276 K/UL — SIGNIFICANT CHANGE UP (ref 150–400)
PLATELET # BLD AUTO: 276 K/UL — SIGNIFICANT CHANGE UP (ref 150–400)
PLATELET # BLD AUTO: 287 K/UL — SIGNIFICANT CHANGE UP (ref 150–400)
PLATELET # BLD AUTO: 317 K/UL — SIGNIFICANT CHANGE UP (ref 150–400)
PO2 BLDA: 112 MMHG — HIGH (ref 83–108)
POTASSIUM SERPL-MCNC: 3.8 MMOL/L — SIGNIFICANT CHANGE UP (ref 3.5–5.3)
POTASSIUM SERPL-MCNC: 3.9 MMOL/L — SIGNIFICANT CHANGE UP (ref 3.5–5.3)
POTASSIUM SERPL-MCNC: 4 MMOL/L — SIGNIFICANT CHANGE UP (ref 3.5–5.3)
POTASSIUM SERPL-MCNC: 4.1 MMOL/L — SIGNIFICANT CHANGE UP (ref 3.5–5.3)
POTASSIUM SERPL-SCNC: 3.8 MMOL/L — SIGNIFICANT CHANGE UP (ref 3.5–5.3)
POTASSIUM SERPL-SCNC: 3.9 MMOL/L — SIGNIFICANT CHANGE UP (ref 3.5–5.3)
POTASSIUM SERPL-SCNC: 4 MMOL/L — SIGNIFICANT CHANGE UP (ref 3.5–5.3)
POTASSIUM SERPL-SCNC: 4.1 MMOL/L — SIGNIFICANT CHANGE UP (ref 3.5–5.3)
PROT SERPL-MCNC: 5.7 G/DL — LOW (ref 6–8.3)
PROT SERPL-MCNC: 6.1 G/DL — SIGNIFICANT CHANGE UP (ref 6–8.3)
PROT SERPL-MCNC: 6.1 G/DL — SIGNIFICANT CHANGE UP (ref 6–8.3)
PROT SERPL-MCNC: 6.4 G/DL — SIGNIFICANT CHANGE UP (ref 6–8.3)
PROT UR-MCNC: ABNORMAL
PROTHROM AB SERPL-ACNC: 12.9 SEC — SIGNIFICANT CHANGE UP (ref 10.6–13.6)
PROTHROM AB SERPL-ACNC: 13.2 SEC — SIGNIFICANT CHANGE UP (ref 10.6–13.6)
PROTHROM AB SERPL-ACNC: 13.5 SEC — SIGNIFICANT CHANGE UP (ref 10.6–13.6)
PROTHROM AB SERPL-ACNC: 14 SEC — HIGH (ref 10.6–13.6)
RBC # BLD: 3.44 M/UL — LOW (ref 3.8–5.2)
RBC # BLD: 3.57 M/UL — LOW (ref 3.8–5.2)
RBC # BLD: 3.72 M/UL — LOW (ref 3.8–5.2)
RBC # BLD: 3.76 M/UL — LOW (ref 3.8–5.2)
RBC # FLD: 14.6 % — HIGH (ref 10.3–14.5)
RBC # FLD: 14.8 % — HIGH (ref 10.3–14.5)
RBC # FLD: 14.8 % — HIGH (ref 10.3–14.5)
RBC # FLD: 15 % — HIGH (ref 10.3–14.5)
SAO2 % BLDA: 98.7 % — SIGNIFICANT CHANGE UP (ref 95–99)
SODIUM SERPL-SCNC: 153 MMOL/L — HIGH (ref 135–145)
SODIUM SERPL-SCNC: 154 MMOL/L — HIGH (ref 135–145)
SODIUM SERPL-SCNC: 154 MMOL/L — HIGH (ref 135–145)
SODIUM SERPL-SCNC: 155 MMOL/L — HIGH (ref 135–145)
SP GR SPEC: 1.05 — HIGH (ref 1.01–1.02)
SPECIMEN SOURCE: SIGNIFICANT CHANGE UP
TROPONIN T, HIGH SENSITIVITY RESULT: 108 NG/L — CRITICAL HIGH
TROPONIN T, HIGH SENSITIVITY RESULT: 137 NG/L — CRITICAL HIGH
TROPONIN T, HIGH SENSITIVITY RESULT: 69 NG/L — CRITICAL HIGH
TROPONIN T, HIGH SENSITIVITY RESULT: 93 NG/L — CRITICAL HIGH
UROBILINOGEN FLD QL: SIGNIFICANT CHANGE UP
WBC # BLD: 22.03 K/UL — HIGH (ref 3.8–10.5)
WBC # BLD: 23.89 K/UL — HIGH (ref 3.8–10.5)
WBC # BLD: 24.11 K/UL — HIGH (ref 3.8–10.5)
WBC # BLD: 25.27 K/UL — HIGH (ref 3.8–10.5)
WBC # FLD AUTO: 22.03 K/UL — HIGH (ref 3.8–10.5)
WBC # FLD AUTO: 23.89 K/UL — HIGH (ref 3.8–10.5)
WBC # FLD AUTO: 24.11 K/UL — HIGH (ref 3.8–10.5)
WBC # FLD AUTO: 25.27 K/UL — HIGH (ref 3.8–10.5)

## 2021-03-31 PROCEDURE — 71250 CT THORAX DX C-: CPT | Mod: 26

## 2021-03-31 PROCEDURE — 71045 X-RAY EXAM CHEST 1 VIEW: CPT | Mod: 26

## 2021-03-31 RX ORDER — NICARDIPINE HYDROCHLORIDE 30 MG/1
5 CAPSULE, EXTENDED RELEASE ORAL
Qty: 40 | Refills: 0 | Status: DISCONTINUED | OUTPATIENT
Start: 2021-03-31 | End: 2021-01-01

## 2021-03-31 RX ORDER — SODIUM CHLORIDE 9 MG/ML
1000 INJECTION, SOLUTION INTRAVENOUS
Refills: 0 | Status: DISCONTINUED | OUTPATIENT
Start: 2021-03-31 | End: 2021-01-01

## 2021-03-31 RX ORDER — FUROSEMIDE 40 MG
40 TABLET ORAL ONCE
Refills: 0 | Status: COMPLETED | OUTPATIENT
Start: 2021-03-31 | End: 2021-03-31

## 2021-03-31 RX ORDER — SODIUM CHLORIDE 9 MG/ML
500 INJECTION, SOLUTION INTRAVENOUS
Refills: 0 | Status: DISCONTINUED | OUTPATIENT
Start: 2021-03-31 | End: 2021-03-31

## 2021-03-31 RX ORDER — SODIUM CHLORIDE 9 MG/ML
1000 INJECTION, SOLUTION INTRAVENOUS
Refills: 0 | Status: DISCONTINUED | OUTPATIENT
Start: 2021-03-31 | End: 2021-03-31

## 2021-03-31 RX ORDER — ALBUMIN HUMAN 25 %
50 VIAL (ML) INTRAVENOUS
Refills: 0 | Status: COMPLETED | OUTPATIENT
Start: 2021-03-31 | End: 2021-03-31

## 2021-03-31 RX ADMIN — Medication 2 DROP(S): at 22:52

## 2021-03-31 RX ADMIN — CHLORHEXIDINE GLUCONATE 15 MILLILITER(S): 213 SOLUTION TOPICAL at 18:15

## 2021-03-31 RX ADMIN — PIPERACILLIN AND TAZOBACTAM 25 GRAM(S): 4; .5 INJECTION, POWDER, LYOPHILIZED, FOR SOLUTION INTRAVENOUS at 09:15

## 2021-03-31 RX ADMIN — Medication 2 DROP(S): at 02:21

## 2021-03-31 RX ADMIN — Medication 2 DROP(S): at 05:49

## 2021-03-31 RX ADMIN — Medication 2 DROP(S): at 11:39

## 2021-03-31 RX ADMIN — NICARDIPINE HYDROCHLORIDE 25 MG/HR: 30 CAPSULE, EXTENDED RELEASE ORAL at 19:14

## 2021-03-31 RX ADMIN — Medication 50 MILLILITER(S): at 08:42

## 2021-03-31 RX ADMIN — CHLORHEXIDINE GLUCONATE 15 MILLILITER(S): 213 SOLUTION TOPICAL at 05:16

## 2021-03-31 RX ADMIN — SODIUM CHLORIDE 500 MILLILITER(S): 9 INJECTION, SOLUTION INTRAVENOUS at 00:45

## 2021-03-31 RX ADMIN — PIPERACILLIN AND TAZOBACTAM 25 GRAM(S): 4; .5 INJECTION, POWDER, LYOPHILIZED, FOR SOLUTION INTRAVENOUS at 01:14

## 2021-03-31 RX ADMIN — Medication 2 DROP(S): at 05:02

## 2021-03-31 RX ADMIN — Medication 2 DROP(S): at 16:06

## 2021-03-31 RX ADMIN — Medication 25 MICROGRAM(S)/HR: at 08:43

## 2021-03-31 RX ADMIN — Medication 216 MILLIGRAM(S): at 05:17

## 2021-03-31 RX ADMIN — INSULIN HUMAN 5 UNIT(S)/HR: 100 INJECTION, SOLUTION SUBCUTANEOUS at 01:14

## 2021-03-31 RX ADMIN — Medication 50 MILLILITER(S): at 08:14

## 2021-03-31 RX ADMIN — PIPERACILLIN AND TAZOBACTAM 25 GRAM(S): 4; .5 INJECTION, POWDER, LYOPHILIZED, FOR SOLUTION INTRAVENOUS at 18:15

## 2021-03-31 RX ADMIN — CHLORHEXIDINE GLUCONATE 1 APPLICATION(S): 213 SOLUTION TOPICAL at 06:19

## 2021-03-31 RX ADMIN — SODIUM CHLORIDE 100 MILLILITER(S): 9 INJECTION, SOLUTION INTRAVENOUS at 19:14

## 2021-03-31 RX ADMIN — Medication 2 DROP(S): at 10:47

## 2021-03-31 RX ADMIN — Medication 2 DROP(S): at 18:15

## 2021-03-31 RX ADMIN — Medication 50 MILLIEQUIVALENT(S): at 00:16

## 2021-03-31 RX ADMIN — Medication 40 MILLIGRAM(S): at 08:15

## 2021-03-31 RX ADMIN — Medication 2 DROP(S): at 08:42

## 2021-03-31 RX ADMIN — INSULIN HUMAN 6 UNIT(S)/HR: 100 INJECTION, SOLUTION SUBCUTANEOUS at 02:59

## 2021-03-31 RX ADMIN — SODIUM CHLORIDE 50 MILLILITER(S): 9 INJECTION, SOLUTION INTRAVENOUS at 10:41

## 2021-03-31 RX ADMIN — INSULIN HUMAN 6 UNIT(S)/HR: 100 INJECTION, SOLUTION SUBCUTANEOUS at 08:43

## 2021-03-31 RX ADMIN — Medication 2 DROP(S): at 14:34

## 2021-03-31 RX ADMIN — Medication 2 DROP(S): at 19:50

## 2021-03-31 RX ADMIN — SODIUM CHLORIDE 125 MILLILITER(S): 9 INJECTION, SOLUTION INTRAVENOUS at 23:24

## 2021-03-31 NOTE — CHART NOTE - NSCHARTNOTEFT_GEN_A_CORE
Per protocol, Pt. due for critical care length of stay nutrition assessment.  Chart reviewed extensively.      Diet, NPO (03-28-21 @ 09:33)      Pt. admitted with hemorrhagic stroke in the setting of cocaine use.  Brain death declared 3/29/2021.  Thus, given currently medical condition no acute nutrition intervention(s) warranted @ this time.    RDN to remain available.          Jenny Dolan RDN, CDN  pager [phone]

## 2021-04-01 VITALS — OXYGEN SATURATION: 99 % | HEART RATE: 90 BPM | RESPIRATION RATE: 12 BRPM

## 2021-04-01 LAB
-  AMPICILLIN/SULBACTAM: SIGNIFICANT CHANGE UP
-  CEFAZOLIN: SIGNIFICANT CHANGE UP
-  CLINDAMYCIN: SIGNIFICANT CHANGE UP
-  ERYTHROMYCIN: SIGNIFICANT CHANGE UP
-  GENTAMICIN: SIGNIFICANT CHANGE UP
-  OXACILLIN: SIGNIFICANT CHANGE UP
-  PENICILLIN: SIGNIFICANT CHANGE UP
-  RIFAMPIN: SIGNIFICANT CHANGE UP
-  TETRACYCLINE: SIGNIFICANT CHANGE UP
-  TRIMETHOPRIM/SULFAMETHOXAZOLE: SIGNIFICANT CHANGE UP
-  VANCOMYCIN: SIGNIFICANT CHANGE UP
ALBUMIN SERPL ELPH-MCNC: 3.2 G/DL — LOW (ref 3.3–5)
ALP SERPL-CCNC: 101 U/L — SIGNIFICANT CHANGE UP (ref 40–120)
ALT FLD-CCNC: 17 U/L — SIGNIFICANT CHANGE UP (ref 4–33)
AMYLASE P1 CFR SERPL: 147 U/L — HIGH (ref 25–125)
ANION GAP SERPL CALC-SCNC: 10 MMOL/L — SIGNIFICANT CHANGE UP (ref 7–14)
APPEARANCE UR: CLEAR — SIGNIFICANT CHANGE UP
APTT BLD: 31.1 SEC — SIGNIFICANT CHANGE UP (ref 27–36.3)
AST SERPL-CCNC: 16 U/L — SIGNIFICANT CHANGE UP (ref 4–32)
BASE EXCESS BLDA CALC-SCNC: -3.5 MMOL/L — LOW (ref -2–2)
BASE EXCESS BLDV CALC-SCNC: -3.5 MMOL/L — LOW (ref -3–2)
BASOPHILS # BLD AUTO: 0.03 K/UL — SIGNIFICANT CHANGE UP (ref 0–0.2)
BASOPHILS NFR BLD AUTO: 0.1 % — SIGNIFICANT CHANGE UP (ref 0–2)
BILIRUB DIRECT SERPL-MCNC: <0.2 MG/DL — SIGNIFICANT CHANGE UP (ref 0–0.2)
BILIRUB SERPL-MCNC: 0.4 MG/DL — SIGNIFICANT CHANGE UP (ref 0.2–1.2)
BILIRUB UR-MCNC: NEGATIVE — SIGNIFICANT CHANGE UP
BLOOD GAS ARTERIAL - LYTES,HGB,ICA,LACT RESULT: SIGNIFICANT CHANGE UP
BLOOD GAS ARTERIAL COMPREHENSIVE RESULT: SIGNIFICANT CHANGE UP
BLOOD GAS VENOUS - CREATININE: 1.6 MG/DL — HIGH (ref 0.5–1.3)
BLOOD GAS VENOUS COMPREHENSIVE RESULT: SIGNIFICANT CHANGE UP
BUN SERPL-MCNC: 21 MG/DL — SIGNIFICANT CHANGE UP (ref 7–23)
CALCIUM SERPL-MCNC: 9.3 MG/DL — SIGNIFICANT CHANGE UP (ref 8.4–10.5)
CHLORIDE BLDV-SCNC: >120 MMOL/L — HIGH (ref 96–108)
CHLORIDE SERPL-SCNC: 124 MMOL/L — HIGH (ref 98–107)
CK MB BLD-MCNC: 11.6 % — HIGH (ref 0–2.5)
CK MB CFR SERPL CALC: 3.7 NG/ML — SIGNIFICANT CHANGE UP
CK SERPL-CCNC: 32 U/L — SIGNIFICANT CHANGE UP (ref 25–170)
CO2 SERPL-SCNC: 21 MMOL/L — LOW (ref 22–31)
COHGB MFR BLDA: 0.5 % — SIGNIFICANT CHANGE UP (ref 0.5–1.5)
COLOR SPEC: SIGNIFICANT CHANGE UP
CREAT SERPL-MCNC: 1.55 MG/DL — HIGH (ref 0.5–1.3)
CULTURE RESULTS: SIGNIFICANT CHANGE UP
DIFF PNL FLD: NEGATIVE — SIGNIFICANT CHANGE UP
EOSINOPHIL # BLD AUTO: 0 K/UL — SIGNIFICANT CHANGE UP (ref 0–0.5)
EOSINOPHIL NFR BLD AUTO: 0 % — SIGNIFICANT CHANGE UP (ref 0–6)
FIBRINOGEN PPP-MCNC: 887 MG/DL — HIGH (ref 290–520)
GAS PNL BLDV: 159 MMOL/L — HIGH (ref 136–146)
GGT SERPL-CCNC: 40 U/L — HIGH (ref 5–36)
GLUCOSE BLDC GLUCOMTR-MCNC: 145 MG/DL — HIGH (ref 70–99)
GLUCOSE BLDC GLUCOMTR-MCNC: 166 MG/DL — HIGH (ref 70–99)
GLUCOSE BLDC GLUCOMTR-MCNC: 73 MG/DL — SIGNIFICANT CHANGE UP (ref 70–99)
GLUCOSE BLDV-MCNC: 196 MG/DL — HIGH (ref 70–99)
GLUCOSE SERPL-MCNC: 170 MG/DL — HIGH (ref 70–99)
GLUCOSE UR QL: NEGATIVE — SIGNIFICANT CHANGE UP
HCO3 BLDA-SCNC: 22 MMOL/L — SIGNIFICANT CHANGE UP (ref 22–26)
HCO3 BLDV-SCNC: 22 MMOL/L — SIGNIFICANT CHANGE UP (ref 20–27)
HCT VFR BLD CALC: 32.2 % — LOW (ref 34.5–45)
HCT VFR BLDA CALC: 32.7 % — LOW (ref 34.5–46.5)
HGB BLD CALC-MCNC: 10.6 G/DL — LOW (ref 11.5–15.5)
HGB BLD-MCNC: 9.7 G/DL — LOW (ref 11.5–15.5)
HGB BLDA-MCNC: 10.6 G/DL — LOW (ref 11.5–15.5)
IANC: 23.51 K/UL — HIGH (ref 1.5–8.5)
IMM GRANULOCYTES NFR BLD AUTO: 3 % — HIGH (ref 0–1.5)
INR BLD: 1.17 RATIO — HIGH (ref 0.88–1.16)
KETONES UR-MCNC: NEGATIVE — SIGNIFICANT CHANGE UP
LACTATE BLDV-MCNC: 0.9 MMOL/L — SIGNIFICANT CHANGE UP (ref 0.5–2)
LDH SERPL L TO P-CCNC: 249 U/L — HIGH (ref 135–225)
LEUKOCYTE ESTERASE UR-ACNC: NEGATIVE — SIGNIFICANT CHANGE UP
LIDOCAIN IGE QN: 10 U/L — SIGNIFICANT CHANGE UP (ref 7–60)
LYMPHOCYTES # BLD AUTO: 0.56 K/UL — LOW (ref 1–3.3)
LYMPHOCYTES # BLD AUTO: 2.2 % — LOW (ref 13–44)
MAGNESIUM SERPL-MCNC: 2 MG/DL — SIGNIFICANT CHANGE UP (ref 1.6–2.6)
MCHC RBC-ENTMCNC: 28.4 PG — SIGNIFICANT CHANGE UP (ref 27–34)
MCHC RBC-ENTMCNC: 30.1 GM/DL — LOW (ref 32–36)
MCV RBC AUTO: 94.4 FL — SIGNIFICANT CHANGE UP (ref 80–100)
METHGB MFR BLDA: 1.2 % — SIGNIFICANT CHANGE UP (ref 0–1.5)
METHOD TYPE: SIGNIFICANT CHANGE UP
MONOCYTES # BLD AUTO: 1.09 K/UL — HIGH (ref 0–0.9)
MONOCYTES NFR BLD AUTO: 4.2 % — SIGNIFICANT CHANGE UP (ref 2–14)
NEUTROPHILS # BLD AUTO: 23.51 K/UL — HIGH (ref 1.8–7.4)
NEUTROPHILS NFR BLD AUTO: 90.5 % — HIGH (ref 43–77)
NITRITE UR-MCNC: NEGATIVE — SIGNIFICANT CHANGE UP
NRBC # BLD: 0 /100 WBCS — SIGNIFICANT CHANGE UP
NRBC # FLD: 0 K/UL — SIGNIFICANT CHANGE UP
ORGANISM # SPEC MICROSCOPIC CNT: SIGNIFICANT CHANGE UP
ORGANISM # SPEC MICROSCOPIC CNT: SIGNIFICANT CHANGE UP
OXYHGB MFR BLDA: 97.9 % — SIGNIFICANT CHANGE UP (ref 94–98)
PCO2 BLDA: 32 MMHG — SIGNIFICANT CHANGE UP (ref 32–48)
PCO2 BLDV: 32 MMHG — LOW (ref 39–42)
PH BLDA: 7.42 — SIGNIFICANT CHANGE UP (ref 7.35–7.45)
PH BLDV: 7.42 — SIGNIFICANT CHANGE UP (ref 7.32–7.43)
PH UR: 5.5 — SIGNIFICANT CHANGE UP (ref 5–8)
PHOSPHATE SERPL-MCNC: 3.7 MG/DL — SIGNIFICANT CHANGE UP (ref 2.5–4.5)
PLATELET # BLD AUTO: 288 K/UL — SIGNIFICANT CHANGE UP (ref 150–400)
PO2 BLDA: 271 MMHG — HIGH (ref 83–108)
PO2 BLDV: 271 MMHG — HIGH (ref 35–40)
POTASSIUM BLDV-SCNC: 4.2 MMOL/L — SIGNIFICANT CHANGE UP (ref 3.4–4.5)
POTASSIUM SERPL-MCNC: 4 MMOL/L — SIGNIFICANT CHANGE UP (ref 3.5–5.3)
POTASSIUM SERPL-SCNC: 4 MMOL/L — SIGNIFICANT CHANGE UP (ref 3.5–5.3)
PROT SERPL-MCNC: 6.2 G/DL — SIGNIFICANT CHANGE UP (ref 6–8.3)
PROT UR-MCNC: NEGATIVE — SIGNIFICANT CHANGE UP
PROTHROM AB SERPL-ACNC: 13.4 SEC — SIGNIFICANT CHANGE UP (ref 10.6–13.6)
RBC # BLD: 3.41 M/UL — LOW (ref 3.8–5.2)
RBC # FLD: 14.9 % — HIGH (ref 10.3–14.5)
SAO2 % BLDA: 99.5 % — HIGH (ref 95–99)
SAO2 % BLDV: 99.5 % — HIGH (ref 60–85)
SODIUM SERPL-SCNC: 155 MMOL/L — HIGH (ref 135–145)
SP GR SPEC: 1.01 — LOW (ref 1.01–1.02)
SPECIMEN SOURCE: SIGNIFICANT CHANGE UP
TROPONIN T, HIGH SENSITIVITY RESULT: 89 NG/L — CRITICAL HIGH
UROBILINOGEN FLD QL: SIGNIFICANT CHANGE UP
WBC # BLD: 25.96 K/UL — HIGH (ref 3.8–10.5)
WBC # FLD AUTO: 25.96 K/UL — HIGH (ref 3.8–10.5)

## 2021-04-01 RX ADMIN — Medication 2 DROP(S): at 01:22

## 2021-04-01 RX ADMIN — PIPERACILLIN AND TAZOBACTAM 25 GRAM(S): 4; .5 INJECTION, POWDER, LYOPHILIZED, FOR SOLUTION INTRAVENOUS at 01:22

## 2021-04-01 RX ADMIN — Medication 2 DROP(S): at 00:10

## 2021-04-01 NOTE — PROGRESS NOTE ADULT - SUBJECTIVE AND OBJECTIVE BOX
Interval Events:    REVIEW OF SYSTEMS: unable to assess    OBJECTIVE:  ICU Vital Signs Last 24 Hrs  T(C): 35.9 (29 Mar 2021 00:00), Max: 36.1 (28 Mar 2021 20:00)  T(F): 96.7 (29 Mar 2021 00:00), Max: 96.9 (28 Mar 2021 20:00)  HR: 50 (29 Mar 2021 07:00) (50 - 120)  BP: 95/75 (29 Mar 2021 01:00) (80/60 - 255/148)  BP(mean): 79 (29 Mar 2021 01:00) (62 - 105)  ABP: 100/71 (29 Mar 2021 07:00) (80/59 - 135/78)  ABP(mean): 83 (29 Mar 2021 07:) (68 - 104)  RR: 14 (29 Mar 2021 07:00) (14 - 21)  SpO2: 99% (29 Mar 2021 07:) (98% - 100%)    Mode: AC/ CMV (Assist Control/ Continuous Mandatory Ventilation), RR (machine): 14, TV (machine): 400, FiO2: 30, PEEP: 5, ITime: 0.91, MAP: 8, PIP: 18    03- @ 07:01  -  - @ 07:00  --------------------------------------------------------  IN: 50.1 mL / OUT: 1795 mL / NET: -1744.9 mL      CAPILLARY BLOOD GLUCOSE  213 (28 Mar 2021 07:13)      POCT Blood Glucose.: 115 mg/dL (29 Mar 2021 00:41)      PHYSICAL EXAM:  GENERAL: intubated, not responsive to noxious stimuli   HEAD:  Atraumatic, Normocephalic  NECK: Supple, No JVD  CHEST/LUNG: on ventilator   HEART: Regular rate and rhythm; No murmurs, rubs, or gallops  ABDOMEN: Bowel sounds present; Soft, Nontender, Nondistended. No hepatomegaly  EXTREMITIES:  No clubbing, cyanosis, or edema  NERVOUS SYSTEM:  pupils fixed and dilated bilaterally   SKIN: No rashes or lesions    LINES:    HOSPITAL MEDICATIONS:      niCARdipine Infusion 5 mG/Hr IV Continuous <Continuous>  norepinephrine Infusion 0.05 MICROgram(s)/kG/Min IV Continuous <Continuous>                sodium chloride 0.9% lock flush 10 milliLiter(s) IV Push every 1 hour PRN      chlorhexidine 0.12% Liquid 15 milliLiter(s) Oral Mucosa every 12 hours  chlorhexidine 4% Liquid 1 Application(s) Topical <User Schedule>        LABS:                        12.7   18.57 )-----------( 440      ( 28 Mar 2021 13:09 )             39.2     Hgb Trend: 12.7<--, 15.4<--      140  |  105  |  14  ----------------------------<  122<H>  3.1<L>   |  22  |  0.81    Ca    8.6      28 Mar 2021 13:09  Phos  2.3       Mg     1.8         TPro  5.9<L>  /  Alb  3.7  /  TBili  0.9  /  DBili  x   /  AST  15  /  ALT  21  /  AlkPhos  112      Creatinine Trend: 0.81<--, 0.65<--  PT/INR - ( 28 Mar 2021 07:33 )   PT: 11.3 sec;   INR: 0.99 ratio         PTT - ( 28 Mar 2021 07:33 )  PTT:29.4 sec  Urinalysis Basic - ( 28 Mar 2021 08:54 )    Color: Colorless / Appearance: Clear / S.010 / pH: x  Gluc: x / Ketone: Trace  / Bili: Negative / Urobili: <2 mg/dL   Blood: x / Protein: 100 mg/dL / Nitrite: Negative   Leuk Esterase: Negative / RBC: 2 /HPF / WBC 1 /HPF   Sq Epi: x / Non Sq Epi: 0 /HPF / Bacteria: Negative      Arterial Blood Gas:   @ 07:03  7.46/33/134/25/98.7/-0.3  ABG lactate: --  Arterial Blood Gas:   @ 13:09  7.49/30/152/25/99.0/-0.3  ABG lactate: --    Venous Blood Gas:   @ 07:33  7.42/39/70/25/94.1  VBG Lactate: 3.2      MICROBIOLOGY:     RADIOLOGY:  [ ] Reviewed and interpreted by me    EKG:
Interval Events:    REVIEW OF SYSTEMS: unable to assess    OBJECTIVE:  ICU Vital Signs Last 24 Hrs  T(C): 36.6 (31 Mar 2021 09:00), Max: 36.6 (31 Mar 2021 08:30)  T(F): 97.9 (31 Mar 2021 09:00), Max: 97.9 (31 Mar 2021 09:00)  HR: 97 (31 Mar 2021 11:00) (80 - 97)  BP: --  BP(mean): --  ABP: 177/96 (31 Mar 2021 11:00) (100/65 - 179/96)  ABP(mean): 129 (31 Mar 2021 11:00) (79 - 130)  RR: 12 (31 Mar 2021 11:00) (12 - 12)  SpO2: 98% (31 Mar 2021 11:) (98% - 100%)    Mode: AC/ CMV (Assist Control/ Continuous Mandatory Ventilation), RR (machine): 12, FiO2: 40, PEEP: 10, ITime: 2.5, MAP: 10, PC: 12, PIP: 22     @ 07:01   @ 07:00  --------------------------------------------------------  IN: 2951.8 mL / OUT: 985 mL / NET: 1966.8 mL     @ 07:01   @ 12:04  --------------------------------------------------------  IN: 299 mL / OUT: 250 mL / NET: 49 mL      CAPILLARY BLOOD GLUCOSE      POCT Blood Glucose.: 117 mg/dL (31 Mar 2021 11:08)      PHYSICAL EXAM:  GENERAL: intubated, not responsive to noxious stimuli   HEAD:  Atraumatic, Normocephalic  NECK: Supple, No JVD  CHEST/LUNG: on ventilator   HEART: Regular rate and rhythm; No murmurs, rubs, or gallops  ABDOMEN: Bowel sounds present; Soft, Nontender, Nondistended. No hepatomegaly  EXTREMITIES:  No clubbing, cyanosis, or edema  NERVOUS SYSTEM:  pupils fixed and dilated bilaterally   SKIN: No rashes or lesions  LINES:    HOSPITAL MEDICATIONS:    piperacillin/tazobactam IVPB.. 3.375 Gram(s) IV Intermittent every 8 hours      dextrose 40% Gel 15 Gram(s) Oral once  dextrose 50% Injectable 25 Gram(s) IV Push once  dextrose 50% Injectable 12.5 Gram(s) IV Push once  dextrose 50% Injectable 25 Gram(s) IV Push once  glucagon  Injectable 1 milliGRAM(s) IntraMuscular once  insulin regular Infusion 6 Unit(s)/Hr IV Continuous <Continuous>  levothyroxine Infusion 10 MICROgram(s)/Hr IV Continuous <Continuous>  methylPREDNISolone sodium succinate IVPB 1000 milliGRAM(s) IV Intermittent daily  vasopressin Infusion. 0.25 Unit(s)/Hr IV Continuous <Continuous>              dextrose 5%. 1000 milliLiter(s) IV Continuous <Continuous>  dextrose 5%. 1000 milliLiter(s) IV Continuous <Continuous>  lactated ringers. 1000 milliLiter(s) IV Continuous <Continuous>  sodium chloride 0.9% lock flush 10 milliLiter(s) IV Push every 1 hour PRN      artificial  tears Solution 2 Drop(s) Both EYES every 2 hours  chlorhexidine 0.12% Liquid 15 milliLiter(s) Oral Mucosa every 12 hours  chlorhexidine 4% Liquid 1 Application(s) Topical <User Schedule>        LABS:                        11.0   24.11 )-----------( 317      ( 31 Mar 2021 09:11 )             35.2     Hgb Trend: 11.0<--, 11.0<--, 11.4<--, 12.1<--, 12.6<--      155<H>  |  119<H>  |  25<H>  ----------------------------<  117<H>  4.1   |  22  |  1.64<H>    Ca    9.1      31 Mar 2021 09:06  Phos  3.5       Mg     1.9         TPro  x   /  Alb  x   /  TBili  0.4  /  DBili  x   /  AST  x   /  ALT  x   /  AlkPhos  x       Creatinine Trend: 1.64<--, 1.50<--, 1.26<--, 1.07<--, 0.94<--, 1.19<--  PT/INR - ( 31 Mar 2021 09:11 )   PT: 13.2 sec;   INR: 1.16 ratio         PTT - ( 31 Mar 2021 09:11 )  PTT:30.9 sec  Urinalysis Basic - ( 31 Mar 2021 02:14 )    Color: Yellow / Appearance: Slightly Turbid / S.047 / pH: x  Gluc: x / Ketone: Trace  / Bili: Negative / Urobili: <2 mg/dL   Blood: x / Protein: Trace / Nitrite: Negative   Leuk Esterase: Negative / RBC: <5 /HPF / WBC <5 /HPF   Sq Epi: x / Non Sq Epi: 2 /HPF / Bacteria: Negative      Arterial Blood Gas:   @ 10:54  --/36/112/24/98.7/-1.2  ABG lactate: --  Arterial Blood Gas:   @ 06:24  7.41/35/285/23/99.5/-2.0  ABG lactate: --  Arterial Blood Gas:   @ 23:48  7.38/38/337/22/99.6/-2.7  ABG lactate: --  Arterial Blood Gas:   @ 20:20  7.39/42/252/24/99.1/0.1  ABG lactate: --  Arterial Blood Gas:   @ 14:22  7.36/43/311/24/99.5/-0.7  ABG lactate: --  Arterial Blood Gas:   @ 08:11  7.39/40/361/24/99.5/-0.7  ABG lactate: --  Arterial Blood Gas:   @ 01:58  7.37/42/368/23/99.0/-1.1  ABG lactate: --  Arterial Blood Gas:   @ 22:28  7.22/68/353/22/99.1/-0.4  ABG lactate: --  Arterial Blood Gas:   @ 18:29  7.38/43/104/25/97.4/0.7  ABG lactate: --        MICROBIOLOGY:     RADIOLOGY:  [ ] Reviewed and interpreted by me    EKG:
Jah Beasley, PGY-1       INTERVAL HPI/OVERNIGHT EVENTS: Brain death protocol was done OV while patient was normothermic. Brain death declared     SUBJECTIVE: Patient seen and examined at bedside. Unable to assess ROS due to patient mental status       VITAL SIGNS:  ICU Vital Signs Last 24 Hrs  T(C): 36.5 (30 Mar 2021 16:00), Max: 36.6 (29 Mar 2021 20:00)  T(F): 97.7 (30 Mar 2021 16:00), Max: 97.9 (30 Mar 2021 12:00)  HR: 85 (30 Mar 2021 16:45) (71 - 97)  BP: --  BP(mean): --  ABP: 118/68 (30 Mar 2021 16:45) (93/61 - 168/98)  ABP(mean): 88 (30 Mar 2021 16:45) (70 - 125)  RR: 12 (30 Mar 2021 16:45) (12 - 14)  SpO2: 100% (30 Mar 2021 16:45) (97% - 100%)    Mode: AC/ CMV (Assist Control/ Continuous Mandatory Ventilation), RR (machine): 12, TV (machine): 400, FiO2: 100, PEEP: 8, ITime: 2.5, MAP: 14, PC: 11, PIP: 19  Plateau pressure:   P/F ratio:      @ 07:  -   @ 07:00  --------------------------------------------------------  IN: 2761.4 mL / OUT: 2430 mL / NET: 331.4 mL     @ 07:  -   @ 18:48  --------------------------------------------------------  IN: 603.3 mL / OUT: 185 mL / NET: 418.3 mL      CAPILLARY BLOOD GLUCOSE      POCT Blood Glucose.: 207 mg/dL (30 Mar 2021 17:14)    ECG:    PHYSICAL EXAM:    GENERAL: intubated, not responsive to noxious stimuli   HEAD:  Atraumatic, Normocephalic  NECK: Supple, No JVD  CHEST/LUNG: on ventilator   HEART: Regular rate and rhythm; No murmurs, rubs, or gallops  ABDOMEN: Bowel sounds present; Soft, Nontender, Nondistended. No hepatomegaly  EXTREMITIES:  No clubbing, cyanosis, or edema  NERVOUS SYSTEM:  pupils fixed and dilated bilaterally   SKIN: No rashes or lesions    MEDICATIONS:  MEDICATIONS  (STANDING):  artificial  tears Solution 2 Drop(s) Both EYES every 2 hours  chlorhexidine 0.12% Liquid 15 milliLiter(s) Oral Mucosa every 12 hours  chlorhexidine 4% Liquid 1 Application(s) Topical <User Schedule>  dextrose 40% Gel 15 Gram(s) Oral once  dextrose 5%. 1000 milliLiter(s) (50 mL/Hr) IV Continuous <Continuous>  dextrose 5%. 1000 milliLiter(s) (100 mL/Hr) IV Continuous <Continuous>  dextrose 50% Injectable 25 Gram(s) IV Push once  dextrose 50% Injectable 12.5 Gram(s) IV Push once  dextrose 50% Injectable 25 Gram(s) IV Push once  furosemide   Injectable 40 milliGRAM(s) IV Push once  glucagon  Injectable 1 milliGRAM(s) IntraMuscular once  insulin lispro (ADMELOG) corrective regimen sliding scale   SubCutaneous every 6 hours  levothyroxine Infusion 10 MICROgram(s)/Hr (25 mL/Hr) IV Continuous <Continuous>  methylPREDNISolone sodium succinate IVPB 1000 milliGRAM(s) IV Intermittent daily  piperacillin/tazobactam IVPB.. 3.375 Gram(s) IV Intermittent every 8 hours    MEDICATIONS  (PRN):  sodium chloride 0.9% lock flush 10 milliLiter(s) IV Push every 1 hour PRN Pre/post blood products, medications, blood draw, and to maintain line patency      ALLERGIES:  Allergies    No Known Allergies    Intolerances        LABS:                        12.1   17.47 )-----------( 308      ( 30 Mar 2021 14:22 )             38.8     03-30    154<H>  |  120<H>  |  20  ----------------------------<  270<H>  3.7   |  23  |  1.07    Ca    9.2      30 Mar 2021 14:22  Phos  2.4     03-30  Mg     2.0         TPro  6.1  /  Alb  3.1<L>  /  TBili  0.5  /  DBili  <0.2  /  AST  11  /  ALT  15  /  AlkPhos  113      PT/INR - ( 30 Mar 2021 14:22 )   PT: 14.2 sec;   INR: 1.25 ratio         PTT - ( 30 Mar 2021 14:22 )  PTT:34.2 sec  Urinalysis Basic - ( 30 Mar 2021 01:59 )    Color: Colorless / Appearance: Clear / S.004 / pH: x  Gluc: x / Ketone: Negative  / Bili: Negative / Urobili: <2 mg/dL   Blood: x / Protein: Negative / Nitrite: Negative   Leuk Esterase: Negative / RBC: x / WBC x   Sq Epi: x / Non Sq Epi: x / Bacteria: x        RADIOLOGY & ADDITIONAL TESTS: Reviewed.
Interval Events:    REVIEW OF SYSTEMS: unable to assess    OBJECTIVE:  ICU Vital Signs Last 24 Hrs  T(C): 36.2 (2021 00:00), Max: 37.6 (31 Mar 2021 18:00)  T(F): 97.2 (2021 00:00), Max: 99.6 (31 Mar 2021 18:00)  HR: 90 (2021 03:00) (90 - 128)  BP: --  BP(mean): --  ABP: 166/89 (2021 03:00) (158/85 - 220/115)  ABP(mean): 120 (2021 03:00) (115 - 162)  RR: 12 (2021 03:00) (12 - 12)  SpO2: 99% (2021 03:00) (96% - 100%)    Mode: AC/ CMV (Assist Control/ Continuous Mandatory Ventilation), RR (machine): 12, FiO2: 40, PEEP: 12, ITime: 2.5, MAP: 18, PC: 12, PIP: 25    03-31 @ 07:01  -  04- @ 07:00  --------------------------------------------------------  IN: 4556.5 mL / OUT: 2635 mL / NET: 1921.5 mL      CAPILLARY BLOOD GLUCOSE      POCT Blood Glucose.: 166 mg/dL (2021 01:58)      PHYSICAL EXAM:  GENERAL: intubated, not responsive to noxious stimuli   HEAD:  Atraumatic, Normocephalic  NECK: Supple, No JVD  CHEST/LUNG: on ventilator   HEART: Regular rate and rhythm; No murmurs, rubs, or gallops  ABDOMEN: Bowel sounds present; Soft, Nontender, Nondistended. No hepatomegaly  EXTREMITIES:  No clubbing, cyanosis, or edema  NERVOUS SYSTEM:  pupils fixed and dilated bilaterally   SKIN: No rashes or lesions    LINES:    HOSPITAL MEDICATIONS:    piperacillin/tazobactam IVPB.. 3.375 Gram(s) IV Intermittent every 8 hours    niCARdipine Infusion 5 mG/Hr IV Continuous <Continuous>    dextrose 40% Gel 15 Gram(s) Oral once  dextrose 50% Injectable 25 Gram(s) IV Push once  dextrose 50% Injectable 12.5 Gram(s) IV Push once  dextrose 50% Injectable 25 Gram(s) IV Push once  glucagon  Injectable 1 milliGRAM(s) IntraMuscular once  insulin regular Infusion 6 Unit(s)/Hr IV Continuous <Continuous>  levothyroxine Infusion 10 MICROgram(s)/Hr IV Continuous <Continuous>  methylPREDNISolone sodium succinate IVPB 1000 milliGRAM(s) IV Intermittent daily  vasopressin Infusion. 0.25 Unit(s)/Hr IV Continuous <Continuous>              dextrose 5%. 1000 milliLiter(s) IV Continuous <Continuous>  dextrose 5%. 1000 milliLiter(s) IV Continuous <Continuous>  lactated ringers. 1000 milliLiter(s) IV Continuous <Continuous>  sodium chloride 0.9% lock flush 10 milliLiter(s) IV Push every 1 hour PRN      artificial  tears Solution 2 Drop(s) Both EYES every 2 hours  chlorhexidine 0.12% Liquid 15 milliLiter(s) Oral Mucosa every 12 hours  chlorhexidine 4% Liquid 1 Application(s) Topical <User Schedule>        LABS:                        9.7    25.96 )-----------( 288      ( 2021 01:13 )             32.2     Hgb Trend: 9.7<--, 9.8<--, 10.5<--, 11.0<--, 11.0<--  04-01    155<H>  |  124<H>  |  21  ----------------------------<  170<H>  4.0   |  21<L>  |  1.55<H>    Ca    9.3      2021 01:13  Phos  3.7     04-  Mg     2.0     04-    TPro  6.2  /  Alb  3.2<L>  /  TBili  0.4  /  DBili  <0.2  /  AST  16  /  ALT  17  /  AlkPhos  101  04-    Creatinine Trend: 1.55<--, 1.75<--, 1.88<--, 1.64<--, 1.50<--, 1.26<--  PT/INR - ( 2021 01:13 )   PT: 13.4 sec;   INR: 1.17 ratio         PTT - ( 2021 01:13 )  PTT:31.1 sec  Urinalysis Basic - ( 2021 01:13 )    Color: Light Yellow / Appearance: Clear / S.006 / pH: x  Gluc: x / Ketone: Negative  / Bili: Negative / Urobili: <2 mg/dL   Blood: x / Protein: Negative / Nitrite: Negative   Leuk Esterase: Negative / RBC: x / WBC x   Sq Epi: x / Non Sq Epi: x / Bacteria: x      Arterial Blood Gas:   @ 06:50  7.42/31/389/21/99.4/-4.4  ABG lactate: --  Arterial Blood Gas:   @ 05:31  7.42/32/271/22/99.5/-3.5  ABG lactate: --  Arterial Blood Gas:   @ 01:13  7.40/35/267/22/99.7/-2.7  ABG lactate: --  Arterial Blood Gas:   @ 20:07  7.39/37/328/22/99.5/-2.7  ABG lactate: --  Arterial Blood Gas:   @ 15:35  7.42/34/367/23/99.9/-2.3  ABG lactate: --  Arterial Blood Gas:   @ 14:32  7.38/38/125/22/99.1/-2.4  ABG lactate: --  Arterial Blood Gas:   @ 10:54  --/36/112/24/98.7/-1.2  ABG lactate: --  Arterial Blood Gas:   @ 06:24  7.41/35/285/23/99.5/-2.0  ABG lactate: --  Arterial Blood Gas:   @ 23:48  7.38/38/337/22/99.6/-2.7  ABG lactate: --  Arterial Blood Gas:   @ 20:20  7.39/42/252/24/99.1/0.1  ABG lactate: --  Arterial Blood Gas:   @ 14:22  7.36/43/311/24/99.5/-0.7  ABG lactate: --  Arterial Blood Gas:   @ 08:11  7.39/40/361/24/99.5/-0.7  ABG lactate: --    Venous Blood Gas:   @ 05:14  TNP/TNP/TNP/TNP/TNP  VBG Lactate: TNP      MICROBIOLOGY:     RADIOLOGY:  [ ] Reviewed and interpreted by me    EKG:

## 2021-04-01 NOTE — PROGRESS NOTE ADULT - ASSESSMENT
56F presents unresponsive and found to have large intraparenchymal hemorrhage, with midline shift and herniation.     #Neuro  -presents with unresponsiveness after reporting left sided numbness, in setting of recent cocaine use. CTH showing large L-sided IPH in left basal ganglia with left to right subfalcine herniation, left uncal herniation, and downward herniation, with intraventricular extension of hemorrhage.   -pupils fixed and dilated. NSGY called, recommended no interventions.  -Brain death declared on 3/30    #CV  -s/p cardene gtt now shock state on levo  -recent cocaine use, avoid beta blockade   -TTE showing LVH and mod LVOT   -LHC and RHC showing normal coronaries     #Resp  -intubated for airway protection in ED  -not triggering breaths    -for lung donation     #Renal  -ANTONIETTA, on IVF   -monitor BMP, strict I/Os    #GI  -NPO for now     #Heme   -no active issues     #ID  -leukocytosis, afebrile.   -on zosyn     #Endo   -FSG Q6 while NPO   -on synthroid as per Beata     
56F presents unresponsive and found to have large intraparenchymal hemorrhage, with midline shift and herniation.     #Neuro  -presents with unresponsiveness after reporting left sided numbness, in setting of recent cocaine use. CTH showing large L-sided IPH in left basal ganglia with left to right subfalcine herniation, left uncal herniation, and downward herniation, with intraventricular extension of hemorrhage.   -pupils fixed and dilated. NSGY called, recommended no interventions. Advised performing brain death protocol.      #CV  -s/p cardene gtt now shock state on levo  -recent cocaine use, avoid beta blockade     #Resp  -intubated for airway protection in ED  -not triggering breaths    -on 16/400/5/40    #Renal  -renal function intact   -monitor BMP, strict I/Os    #GI  -NPO for now     #Heme   -no active issues   -no AC given brain bleed     #ID  -leukocytosis, afebrile. monitor for signs of infection     #Endo   -FSG Q6 while NPO   
56F presents unresponsive and found to have large intraparenchymal hemorrhage, with midline shift and herniation.     #Neuro  -presents with unresponsiveness after reporting left sided numbness, in setting of recent cocaine use. CTH showing large L-sided IPH in left basal ganglia with left to right subfalcine herniation, left uncal herniation, and downward herniation, with intraventricular extension of hemorrhage.   -pupils fixed and dilated. NSGY called, recommended no interventions.  -Brain death declared on 3/30    #CV  -s/p cardene gtt now shock state on levo  -recent cocaine use, avoid beta blockade   -TTE showing LVH and mod LVOT   -LHC and RHC showing normal coronaries     #Resp  -intubated for airway protection in ED  -not triggering breaths    -for lung donation     #Renal  -ANTONIETTA, on IVF   -monitor BMP, strict I/Os    #GI  -NPO for now     #Heme   -no active issues     #ID  -leukocytosis, afebrile.   -on zosyn     #Endo   -FSG Q6 while NPO   -on synthroid as per Beata 
56F presents unresponsive and found to have large intraparenchymal hemorrhage, with midline shift and herniation.     #Neuro  -presents with unresponsiveness after reporting left sided numbness, in setting of recent cocaine use. CTH showing large L-sided IPH in left basal ganglia with left to right subfalcine herniation, left uncal herniation, and downward herniation, with intraventricular extension of hemorrhage.   -pupils fixed and dilated. NSGY called, recommended no interventions.  -Brain death declared while patient normothermic   -CT chest, A/P and Chest Xray to be ordered after bronch     #CV  -s/p cardene gtt now shock state on levo  -recent cocaine use, avoid beta blockade   -TTE ordered  -LHC and RHC to be done today     #Resp  -intubated for airway protection in ED  -not triggering breaths    -on 16/400/5/40    #Renal  -renal function intact   -monitor BMP, strict I/Os    #GI  -NPO for now     #Heme   -no active issues   -no AC given brain bleed     #ID  -leukocytosis, afebrile. monitor for signs of infection     #Endo   -FSG Q6 while NPO

## 2021-04-29 LAB
CULTURE RESULTS: SIGNIFICANT CHANGE UP
SPECIMEN SOURCE: SIGNIFICANT CHANGE UP

## 2021-05-05 LAB — CULTURE RESULTS: SIGNIFICANT CHANGE UP

## 2021-05-06 LAB
-  FLUCONAZOLE: SIGNIFICANT CHANGE UP
-  INTERPRETATION: SIGNIFICANT CHANGE UP
CULTURE RESULTS: SIGNIFICANT CHANGE UP
METHOD TYPE: SIGNIFICANT CHANGE UP
ORGANISM # SPEC MICROSCOPIC CNT: SIGNIFICANT CHANGE UP
ORGANISM # SPEC MICROSCOPIC CNT: SIGNIFICANT CHANGE UP

## 2022-03-03 NOTE — PROCEDURAL SAFETY CHECKLIST WITH OR WITHOUT SEDATION - NSPRESEDATIONFT_GEN_ALL_CORE
Physician confirms case reviewed for anesthesia consultation requirements. Was The Patient On Physician Recommended Anticoagulation Therapy?: Please Select the Appropriate Response

## 2024-01-11 NOTE — ED ADULT NURSE NOTE - PRO INTERPRETER NEED 2
Provider: DR GRIMALDO    Caller: GANESH WITH [employer]    Phone Number: [phone]    Reason for Call: NEED INFORMATION ON DOSE OF ULTOMIRIS MEDICATION ADMINISTERED ON 9/19/23. PLEASE ADVISE, THANK YOU.   English